# Patient Record
Sex: FEMALE | Race: BLACK OR AFRICAN AMERICAN | Employment: PART TIME | URBAN - METROPOLITAN AREA
[De-identification: names, ages, dates, MRNs, and addresses within clinical notes are randomized per-mention and may not be internally consistent; named-entity substitution may affect disease eponyms.]

---

## 2017-05-12 PROBLEM — L73.2 HIDRADENITIS SUPPURATIVA: Status: ACTIVE | Noted: 2017-05-12

## 2018-04-30 ENCOUNTER — OFFICE VISIT (OUTPATIENT)
Dept: INTERNAL MEDICINE CLINIC | Age: 28
End: 2018-04-30
Payer: COMMERCIAL

## 2018-04-30 VITALS
HEIGHT: 64 IN | BODY MASS INDEX: 41.11 KG/M2 | DIASTOLIC BLOOD PRESSURE: 77 MMHG | OXYGEN SATURATION: 100 % | WEIGHT: 240.8 LBS | SYSTOLIC BLOOD PRESSURE: 120 MMHG | HEART RATE: 76 BPM

## 2018-04-30 DIAGNOSIS — D75.839 THROMBOCYTOSIS: ICD-10-CM

## 2018-04-30 DIAGNOSIS — M25.561 ACUTE PAIN OF RIGHT KNEE: Primary | ICD-10-CM

## 2018-04-30 DIAGNOSIS — Z13.29 THYROID DISORDER SCREENING: ICD-10-CM

## 2018-04-30 DIAGNOSIS — Z12.4 CERVICAL CANCER SCREENING: ICD-10-CM

## 2018-04-30 DIAGNOSIS — E78.00 PURE HYPERCHOLESTEROLEMIA: ICD-10-CM

## 2018-04-30 DIAGNOSIS — E55.9 VITAMIN D DEFICIENCY: ICD-10-CM

## 2018-04-30 PROCEDURE — 99212 OFFICE O/P EST SF 10 MIN: CPT | Performed by: FAMILY MEDICINE

## 2018-04-30 PROCEDURE — 1036F TOBACCO NON-USER: CPT | Performed by: FAMILY MEDICINE

## 2018-04-30 PROCEDURE — G8417 CALC BMI ABV UP PARAM F/U: HCPCS | Performed by: FAMILY MEDICINE

## 2018-04-30 PROCEDURE — G8427 DOCREV CUR MEDS BY ELIG CLIN: HCPCS | Performed by: FAMILY MEDICINE

## 2018-04-30 PROCEDURE — 99213 OFFICE O/P EST LOW 20 MIN: CPT | Performed by: FAMILY MEDICINE

## 2018-04-30 ASSESSMENT — ENCOUNTER SYMPTOMS
CHOKING: 0
VOMITING: 0
ABDOMINAL PAIN: 0
NAUSEA: 0
DIARRHEA: 0
COUGH: 0
TROUBLE SWALLOWING: 0
EYE PAIN: 0
CONSTIPATION: 0
PHOTOPHOBIA: 0
ABDOMINAL DISTENTION: 0
BACK PAIN: 0
EYE DISCHARGE: 0
SORE THROAT: 0
SHORTNESS OF BREATH: 0
CHEST TIGHTNESS: 0
BLOOD IN STOOL: 0
RHINORRHEA: 0
WHEEZING: 0

## 2018-04-30 ASSESSMENT — PATIENT HEALTH QUESTIONNAIRE - PHQ9
SUM OF ALL RESPONSES TO PHQ9 QUESTIONS 1 & 2: 0
2. FEELING DOWN, DEPRESSED OR HOPELESS: 0
SUM OF ALL RESPONSES TO PHQ QUESTIONS 1-9: 0
1. LITTLE INTEREST OR PLEASURE IN DOING THINGS: 0

## 2018-05-07 PROBLEM — D75.839 THROMBOCYTOSIS: Status: ACTIVE | Noted: 2018-05-07

## 2018-10-01 ENCOUNTER — OFFICE VISIT (OUTPATIENT)
Dept: INTERNAL MEDICINE CLINIC | Age: 28
End: 2018-10-01
Payer: COMMERCIAL

## 2018-10-01 ENCOUNTER — HOSPITAL ENCOUNTER (OUTPATIENT)
Age: 28
Discharge: HOME | End: 2018-10-01
Payer: COMMERCIAL

## 2018-10-01 VITALS
HEIGHT: 63 IN | OXYGEN SATURATION: 95 % | SYSTOLIC BLOOD PRESSURE: 136 MMHG | WEIGHT: 239 LBS | BODY MASS INDEX: 42.35 KG/M2 | TEMPERATURE: 98 F | HEART RATE: 74 BPM | DIASTOLIC BLOOD PRESSURE: 98 MMHG

## 2018-10-01 DIAGNOSIS — Z13.29 THYROID DISORDER SCREENING: ICD-10-CM

## 2018-10-01 DIAGNOSIS — D75.839 THROMBOCYTOSIS: ICD-10-CM

## 2018-10-01 DIAGNOSIS — Z23 INFLUENZA VACCINE ADMINISTERED: ICD-10-CM

## 2018-10-01 DIAGNOSIS — E55.9 VITAMIN D DEFICIENCY: ICD-10-CM

## 2018-10-01 DIAGNOSIS — Z11.4 ENCOUNTER FOR SCREENING FOR HIV: Primary | ICD-10-CM

## 2018-10-01 DIAGNOSIS — E78.00 PURE HYPERCHOLESTEROLEMIA: ICD-10-CM

## 2018-10-01 DIAGNOSIS — Z11.4 ENCOUNTER FOR SCREENING FOR HIV: ICD-10-CM

## 2018-10-01 DIAGNOSIS — F41.8 ANXIETY WITH DEPRESSION: ICD-10-CM

## 2018-10-01 LAB
ALBUMIN SERPL-MCNC: 3.9 G/DL (ref 3.5–5.2)
ALP BLD-CCNC: 79 U/L (ref 35–104)
ALT SERPL-CCNC: 19 U/L (ref 0–32)
ANION GAP SERPL CALCULATED.3IONS-SCNC: 13 MMOL/L (ref 7–16)
AST SERPL-CCNC: 15 U/L (ref 0–31)
BASOPHILS ABSOLUTE: 0.04 E9/L (ref 0–0.2)
BASOPHILS RELATIVE PERCENT: 0.4 % (ref 0–2)
BILIRUB SERPL-MCNC: 0.3 MG/DL (ref 0–1.2)
BUN BLDV-MCNC: 11 MG/DL (ref 6–20)
CALCIUM SERPL-MCNC: 8.7 MG/DL (ref 8.6–10.2)
CHLORIDE BLD-SCNC: 103 MMOL/L (ref 98–107)
CHOLESTEROL, TOTAL: 174 MG/DL (ref 0–199)
CO2: 23 MMOL/L (ref 22–29)
CREAT SERPL-MCNC: 0.6 MG/DL (ref 0.5–1)
EOSINOPHILS ABSOLUTE: 0.93 E9/L (ref 0.05–0.5)
EOSINOPHILS RELATIVE PERCENT: 9.1 % (ref 0–6)
GFR AFRICAN AMERICAN: >60
GFR NON-AFRICAN AMERICAN: >60 ML/MIN/1.73
GLUCOSE BLD-MCNC: 104 MG/DL (ref 74–109)
HCT VFR BLD CALC: 37.3 % (ref 34–48)
HDLC SERPL-MCNC: 40 MG/DL
HEMOGLOBIN: 12.4 G/DL (ref 11.5–15.5)
IMMATURE GRANULOCYTES #: 0.03 E9/L
IMMATURE GRANULOCYTES %: 0.3 % (ref 0–5)
LDL CHOLESTEROL CALCULATED: 119 MG/DL (ref 0–99)
LYMPHOCYTES ABSOLUTE: 2.28 E9/L (ref 1.5–4)
LYMPHOCYTES RELATIVE PERCENT: 22.4 % (ref 20–42)
MCH RBC QN AUTO: 29.4 PG (ref 26–35)
MCHC RBC AUTO-ENTMCNC: 33.2 % (ref 32–34.5)
MCV RBC AUTO: 88.4 FL (ref 80–99.9)
MONOCYTES ABSOLUTE: 0.46 E9/L (ref 0.1–0.95)
MONOCYTES RELATIVE PERCENT: 4.5 % (ref 2–12)
NEUTROPHILS ABSOLUTE: 6.44 E9/L (ref 1.8–7.3)
NEUTROPHILS RELATIVE PERCENT: 63.3 % (ref 43–80)
PDW BLD-RTO: 14.6 FL (ref 11.5–15)
PLATELET # BLD: 467 E9/L (ref 130–450)
PMV BLD AUTO: 8.8 FL (ref 7–12)
POTASSIUM SERPL-SCNC: 3.7 MMOL/L (ref 3.5–5)
RBC # BLD: 4.22 E12/L (ref 3.5–5.5)
SODIUM BLD-SCNC: 139 MMOL/L (ref 132–146)
TOTAL PROTEIN: 7.3 G/DL (ref 6.4–8.3)
TRIGL SERPL-MCNC: 75 MG/DL (ref 0–149)
TSH SERPL DL<=0.05 MIU/L-ACNC: 2.1 UIU/ML (ref 0.27–4.2)
VITAMIN D 25-HYDROXY: 9 NG/ML (ref 30–100)
VLDLC SERPL CALC-MCNC: 15 MG/DL
WBC # BLD: 10.2 E9/L (ref 4.5–11.5)

## 2018-10-01 PROCEDURE — 99213 OFFICE O/P EST LOW 20 MIN: CPT | Performed by: FAMILY MEDICINE

## 2018-10-01 PROCEDURE — 80061 LIPID PANEL: CPT

## 2018-10-01 PROCEDURE — 86703 HIV-1/HIV-2 1 RESULT ANTBDY: CPT

## 2018-10-01 PROCEDURE — G8417 CALC BMI ABV UP PARAM F/U: HCPCS | Performed by: FAMILY MEDICINE

## 2018-10-01 PROCEDURE — 1036F TOBACCO NON-USER: CPT | Performed by: FAMILY MEDICINE

## 2018-10-01 PROCEDURE — G8427 DOCREV CUR MEDS BY ELIG CLIN: HCPCS | Performed by: FAMILY MEDICINE

## 2018-10-01 PROCEDURE — 80053 COMPREHEN METABOLIC PANEL: CPT

## 2018-10-01 PROCEDURE — 84443 ASSAY THYROID STIM HORMONE: CPT

## 2018-10-01 PROCEDURE — 82306 VITAMIN D 25 HYDROXY: CPT

## 2018-10-01 PROCEDURE — 36415 COLL VENOUS BLD VENIPUNCTURE: CPT

## 2018-10-01 PROCEDURE — G8482 FLU IMMUNIZE ORDER/ADMIN: HCPCS | Performed by: FAMILY MEDICINE

## 2018-10-01 PROCEDURE — 85025 COMPLETE CBC W/AUTO DIFF WBC: CPT

## 2018-10-01 PROCEDURE — 90688 IIV4 VACCINE SPLT 0.5 ML IM: CPT | Performed by: FAMILY MEDICINE

## 2018-10-01 RX ORDER — VENLAFAXINE HYDROCHLORIDE 37.5 MG/1
37.5 CAPSULE, EXTENDED RELEASE ORAL DAILY
Qty: 49 CAPSULE | Refills: 2 | Status: CANCELLED | OUTPATIENT
Start: 2018-10-01

## 2018-10-01 RX ORDER — VENLAFAXINE HYDROCHLORIDE 37.5 MG/1
37.5 TABLET, EXTENDED RELEASE ORAL
Qty: 7 TABLET | Refills: 0 | Status: SHIPPED | OUTPATIENT
Start: 2018-10-01 | End: 2018-11-05 | Stop reason: DRUGHIGH

## 2018-10-01 RX ORDER — VENLAFAXINE HYDROCHLORIDE 75 MG/1
75 TABLET, EXTENDED RELEASE ORAL
Qty: 30 TABLET | Refills: 1 | Status: SHIPPED | OUTPATIENT
Start: 2018-10-01 | End: 2018-11-05 | Stop reason: SDUPTHER

## 2018-10-01 RX ORDER — ERGOCALCIFEROL 1.25 MG/1
50000 CAPSULE ORAL WEEKLY
Qty: 12 CAPSULE | Refills: 0 | Status: SHIPPED | OUTPATIENT
Start: 2018-10-01 | End: 2018-10-25 | Stop reason: SDUPTHER

## 2018-10-01 ASSESSMENT — ENCOUNTER SYMPTOMS
ABDOMINAL DISTENTION: 0
WHEEZING: 0
EYE PAIN: 0
PHOTOPHOBIA: 0
VOMITING: 0
RHINORRHEA: 0
NAUSEA: 0
EYE DISCHARGE: 0
COUGH: 0
CHOKING: 0
CHEST TIGHTNESS: 0
ABDOMINAL PAIN: 0
BACK PAIN: 0
CONSTIPATION: 0
TROUBLE SWALLOWING: 0
BLOOD IN STOOL: 0
DIARRHEA: 0
SHORTNESS OF BREATH: 0
SORE THROAT: 0

## 2018-10-01 NOTE — PROGRESS NOTES
Height: 5' 3\" (1.6 m)      Physical Exam   Constitutional: She is oriented to person, place, and time. She appears well-developed and well-nourished. No distress. Large body habitus   HENT:   Head: Normocephalic and atraumatic. Right Ear: External ear normal.   Left Ear: External ear normal.   Nose: Nose normal.   Mouth/Throat: Oropharynx is clear and moist.   Eyes: Pupils are equal, round, and reactive to light. Conjunctivae and EOM are normal.   Neck: Normal range of motion. Neck supple. No thyromegaly present. Cardiovascular: Normal rate, regular rhythm, normal heart sounds and intact distal pulses. Exam reveals no gallop and no friction rub. No murmur heard. Pulmonary/Chest: Effort normal and breath sounds normal. No respiratory distress. She has no wheezes. Abdominal: Soft. Bowel sounds are normal. She exhibits no distension and no mass. There is no hepatosplenomegaly. There is no tenderness. Abdominal obesity   Musculoskeletal: Normal range of motion. She exhibits no edema or tenderness. Neurological: She is alert and oriented to person, place, and time. She has normal reflexes. Skin: Skin is warm and dry. No rash noted. Psychiatric: Judgment and thought content normal. Her mood appears anxious. Vitals reviewed. Osteopathic exam:  Patient evaluated in the seated position. Normal thoracic kyphosis and lumbar lordosis. No acute tissue texture changes. No acute somatic dysfunction of the cervical, thoracic, or lumbar spine.     Lab Results    Lab Results   Component Value Date    WBC 12.3 (H) 01/15/2018    HGB 12.8 01/15/2018    HCT 38.3 01/15/2018     (H) 01/15/2018    CHOL 169 08/25/2017    TRIG 122 08/25/2017    HDL 35 01/15/2018    ALT 13 01/15/2018    AST 15 01/15/2018     01/15/2018    K 3.8 01/15/2018     01/15/2018    CREATININE 0.6 01/15/2018    BUN 10 01/15/2018    CO2 23 01/15/2018    TSH 1.910 08/25/2017    GLUF 83 01/15/2018     Lab Results

## 2018-10-02 LAB
HIV-1 AND HIV-2 ANTIBODIES: NORMAL
PATHOLOGIST REVIEW: NORMAL

## 2018-10-25 DIAGNOSIS — E55.9 VITAMIN D DEFICIENCY: ICD-10-CM

## 2018-10-25 RX ORDER — ERGOCALCIFEROL 1.25 MG/1
50000 CAPSULE ORAL WEEKLY
Qty: 4 CAPSULE | Refills: 1 | Status: SHIPPED | OUTPATIENT
Start: 2018-10-25 | End: 2018-11-05 | Stop reason: SDUPTHER

## 2018-11-05 ENCOUNTER — OFFICE VISIT (OUTPATIENT)
Dept: INTERNAL MEDICINE CLINIC | Age: 28
End: 2018-11-05
Payer: COMMERCIAL

## 2018-11-05 ENCOUNTER — HOSPITAL ENCOUNTER (OUTPATIENT)
Age: 28
Discharge: HOME | End: 2018-11-07
Payer: COMMERCIAL

## 2018-11-05 VITALS
DIASTOLIC BLOOD PRESSURE: 95 MMHG | HEART RATE: 92 BPM | SYSTOLIC BLOOD PRESSURE: 135 MMHG | BODY MASS INDEX: 41.46 KG/M2 | HEIGHT: 63 IN | OXYGEN SATURATION: 95 % | TEMPERATURE: 98.1 F | WEIGHT: 234 LBS

## 2018-11-05 DIAGNOSIS — D75.839 THROMBOCYTOSIS: ICD-10-CM

## 2018-11-05 DIAGNOSIS — L73.2 HIDRADENITIS SUPPURATIVA: ICD-10-CM

## 2018-11-05 DIAGNOSIS — Z01.419 WELL WOMAN EXAM WITH ROUTINE GYNECOLOGICAL EXAM: Primary | ICD-10-CM

## 2018-11-05 DIAGNOSIS — E78.00 PURE HYPERCHOLESTEROLEMIA: ICD-10-CM

## 2018-11-05 DIAGNOSIS — E55.9 VITAMIN D DEFICIENCY: ICD-10-CM

## 2018-11-05 DIAGNOSIS — F41.8 ANXIETY WITH DEPRESSION: ICD-10-CM

## 2018-11-05 DIAGNOSIS — Z12.4 PAP SMEAR FOR CERVICAL CANCER SCREENING: ICD-10-CM

## 2018-11-05 PROCEDURE — 87591 N.GONORRHOEAE DNA AMP PROB: CPT

## 2018-11-05 PROCEDURE — 99213 OFFICE O/P EST LOW 20 MIN: CPT | Performed by: FAMILY MEDICINE

## 2018-11-05 PROCEDURE — 87624 HPV HI-RISK TYP POOLED RSLT: CPT

## 2018-11-05 PROCEDURE — 1036F TOBACCO NON-USER: CPT | Performed by: FAMILY MEDICINE

## 2018-11-05 PROCEDURE — G8482 FLU IMMUNIZE ORDER/ADMIN: HCPCS | Performed by: FAMILY MEDICINE

## 2018-11-05 PROCEDURE — 87491 CHLMYD TRACH DNA AMP PROBE: CPT

## 2018-11-05 PROCEDURE — G8427 DOCREV CUR MEDS BY ELIG CLIN: HCPCS | Performed by: FAMILY MEDICINE

## 2018-11-05 PROCEDURE — 88175 CYTOPATH C/V AUTO FLUID REDO: CPT

## 2018-11-05 PROCEDURE — G8417 CALC BMI ABV UP PARAM F/U: HCPCS | Performed by: FAMILY MEDICINE

## 2018-11-05 RX ORDER — VENLAFAXINE HYDROCHLORIDE 75 MG/1
75 TABLET, EXTENDED RELEASE ORAL
Qty: 30 TABLET | Refills: 3 | Status: SHIPPED | OUTPATIENT
Start: 2018-11-05 | End: 2019-02-25

## 2018-11-05 RX ORDER — ERGOCALCIFEROL 1.25 MG/1
50000 CAPSULE ORAL WEEKLY
Qty: 4 CAPSULE | Refills: 2 | Status: SHIPPED | OUTPATIENT
Start: 2018-11-05 | End: 2019-02-25 | Stop reason: SDUPTHER

## 2018-11-05 ASSESSMENT — ENCOUNTER SYMPTOMS
DIARRHEA: 0
CHEST TIGHTNESS: 0
TROUBLE SWALLOWING: 0
SHORTNESS OF BREATH: 0
CHOKING: 0
NAUSEA: 0
BLOOD IN STOOL: 0
ABDOMINAL PAIN: 0
EYE DISCHARGE: 0
CONSTIPATION: 0
BACK PAIN: 0
WHEEZING: 0
PHOTOPHOBIA: 0
SORE THROAT: 0
RHINORRHEA: 0
COUGH: 0
ABDOMINAL DISTENTION: 0
EYE PAIN: 0
VOMITING: 0

## 2018-11-05 NOTE — PROGRESS NOTES
thoracic kyphosis and lumbar lordosis. No acute tissue texture changes. No acute somatic dysfunction of the cervical, thoracic, or lumbar spine. Lab Results    Lab Results   Component Value Date    WBC 10.2 10/01/2018    HGB 12.4 10/01/2018    HCT 37.3 10/01/2018     (H) 10/01/2018    CHOL 174 10/01/2018    TRIG 75 10/01/2018    HDL 40 10/01/2018    ALT 19 10/01/2018    AST 15 10/01/2018     10/01/2018    K 3.7 10/01/2018     10/01/2018    CREATININE 0.6 10/01/2018    BUN 11 10/01/2018    CO2 23 10/01/2018    TSH 2.100 10/01/2018    GLUF 83 01/15/2018     No results found for: PSA, CEA, , [id number],   Assessment and Plan:  [facility] was seen today for 1 month follow-up and health maintenance. Diagnoses and all orders for this visit:    Well woman exam with routine gynecological exam  -     PAP SMEAR    Anxiety with depression  -     venlafaxine 75 MG extended release tablet; Take 1 tablet by mouth daily (with breakfast)    Vitamin D deficiency  -     vitamin D (ERGOCALCIFEROL) 52062 units CAPS capsule; Take 1 capsule by mouth once a week    Pap smear for cervical cancer screening  -     PAP SMEAR    Thrombocytosis ([address]        -    Continue to monitor    Pure hypercholesterolemia        -    Low fat diet and increase physical activity    Hidradenitis suppurativa         -    No active lesions       Return in about 2 weeks (around 11/19/2018) for Pap results. .    Patient may come in sooner if needed for medical concerns. Patient advised tocall at any time to cancel or re-schedule or for any questions/concerns. Patient was seen and discussed with attending physician, Dr. Valentin Clemens.     Sabiha Arnold DO PGY3 [facility]  11/05/18  1:50 PM

## 2018-11-05 NOTE — PATIENT INSTRUCTIONS
cancer. Two tests can be used to screen for cervical cancer. They may be used alone or together. A Pap test.   This test looks for changes in the cells of the cervix. Abnormal cells may be a sign of cancer. A human papillomavirus (HPV) test.   This test looks for the HPV virus. Some types of HPV can cause cancer. During either test, the doctor or nurse will insert a tool called a speculum into your vagina. The speculum gently spreads apart the vaginal walls. It allows your doctor to see inside the vagina and the cervix. He or she uses a small swab or brush to collect cell samples from your cervix. Try to schedule the test when you're not having your period. To get ready for the test, avoid douches, tampons, vaginal medicines, sprays, and powders for at least a day before you have the test.  When should you have a screening test?  These guidelines apply to women who are at average risk of cervical cancer. If you don't know your risk, talk with your doctor. Women 21 to 34  · You can start having a Pap test at age 24. It is done every 3 years. · You can start having the primary HPV test at age 22. It is done every 3 years. Women 30 to 59  · If you had both a Pap test and an HPV test last time and both were normal, you can have a Pap plus an HPV test every 5 years. · If you had only a Pap test last time, as long as your results are normal, you can have a Pap test every 3 years. · If you had only an HPV test last time, as long as your results are normal, you can have an HPV test every 3 years. Women 72 and older  · If you are age 72 or older, talk with your doctor about what's right for you. Women ages 72 and older may no longer need to be screened for cervical cancer. When to stop having screening tests depends on your medical history, your overall health, and your risk of cervical cell changes or cervical cancer.   What happens after the test?  The sample of cells taken during your test will be sent to a lab

## 2018-11-12 LAB
CHLAMYDIA TRACHOMATIS AMPLIFIED DET: NORMAL
N GONORRHOEAE AMPLIFIED DET: NORMAL

## 2018-11-14 LAB
CORRESPONDING PAP CASE #: NORMAL
HPV, HIGH RISK: NEGATIVE

## 2018-11-26 ENCOUNTER — OFFICE VISIT (OUTPATIENT)
Dept: INTERNAL MEDICINE CLINIC | Age: 28
End: 2018-11-26
Payer: COMMERCIAL

## 2018-11-26 VITALS
SYSTOLIC BLOOD PRESSURE: 139 MMHG | OXYGEN SATURATION: 94 % | DIASTOLIC BLOOD PRESSURE: 82 MMHG | BODY MASS INDEX: 41.46 KG/M2 | TEMPERATURE: 98.1 F | WEIGHT: 234 LBS | HEART RATE: 90 BPM | HEIGHT: 63 IN

## 2018-11-26 DIAGNOSIS — B96.89 BACTERIAL VAGINOSIS: ICD-10-CM

## 2018-11-26 DIAGNOSIS — N76.0 BACTERIAL VAGINOSIS: ICD-10-CM

## 2018-11-26 DIAGNOSIS — Z71.2 ENCOUNTER TO DISCUSS TEST RESULTS: Primary | ICD-10-CM

## 2018-11-26 DIAGNOSIS — R40.20 LOC (LOSS OF CONSCIOUSNESS) (HCC): ICD-10-CM

## 2018-11-26 PROCEDURE — 99212 OFFICE O/P EST SF 10 MIN: CPT | Performed by: FAMILY MEDICINE

## 2018-11-26 PROCEDURE — G8482 FLU IMMUNIZE ORDER/ADMIN: HCPCS | Performed by: FAMILY MEDICINE

## 2018-11-26 PROCEDURE — G8417 CALC BMI ABV UP PARAM F/U: HCPCS | Performed by: FAMILY MEDICINE

## 2018-11-26 PROCEDURE — G8427 DOCREV CUR MEDS BY ELIG CLIN: HCPCS | Performed by: FAMILY MEDICINE

## 2018-11-26 PROCEDURE — 99213 OFFICE O/P EST LOW 20 MIN: CPT | Performed by: FAMILY MEDICINE

## 2018-11-26 PROCEDURE — 1036F TOBACCO NON-USER: CPT | Performed by: FAMILY MEDICINE

## 2018-11-26 RX ORDER — METRONIDAZOLE 500 MG/1
500 TABLET ORAL 2 TIMES DAILY
Qty: 14 TABLET | Refills: 0 | Status: SHIPPED | OUTPATIENT
Start: 2018-11-26 | End: 2019-03-01 | Stop reason: SDUPTHER

## 2018-11-26 ASSESSMENT — ENCOUNTER SYMPTOMS
BACK PAIN: 0
CONSTIPATION: 0
EYE DISCHARGE: 0
WHEEZING: 0
DIARRHEA: 0
EYE PAIN: 0
VOMITING: 0
CHOKING: 0
PHOTOPHOBIA: 0
TROUBLE SWALLOWING: 0
ABDOMINAL PAIN: 0
ABDOMINAL DISTENTION: 0
RHINORRHEA: 0
SORE THROAT: 0
BLOOD IN STOOL: 0
COUGH: 0
NAUSEA: 0
SHORTNESS OF BREATH: 0
CHEST TIGHTNESS: 0

## 2018-11-26 NOTE — PROGRESS NOTES
Subjective:  33 y.o. female who presents in office today for results pap smear collected on 11/05/2018. Patient is complaining of malodorous vaginal discharge. Patient also complaining of \"passing out\" on the evening of 11/24/2018. Patient resumed Effexor XR on 10/01/2018 and took the medication at 6 pm. Patient states that she consumed a few glasses of wine about 2- 3 hrs later. Patient states that she passed out 3 times afterwards. Advised patient not to consume alcohol and Effexor together. NEGATIVE FOR INTRAEPITHELIAL LESION OR MALIGNANCY    INTERPRETATION/RESULT:  Predominance of coccobacilli consistent with a shift in vaginal jason      Review of Systems   Constitutional: Negative for activity change, appetite change, chills, fever and unexpected weight change. HENT: Negative for congestion, ear pain, hearing loss, rhinorrhea, sore throat and trouble swallowing. Eyes: Negative for photophobia, pain, discharge and visual disturbance. Respiratory: Negative for cough, choking, chest tightness, shortness of breath and wheezing. Cardiovascular: Negative for chest pain, palpitations and leg swelling. Gastrointestinal: Negative for abdominal distention, abdominal pain, blood in stool, constipation, diarrhea, nausea and vomiting. Endocrine: Negative for cold intolerance, heat intolerance, polydipsia and polyuria. Genitourinary: Positive for vaginal discharge. Negative for decreased urine volume, dysuria, flank pain, frequency, hematuria and urgency. Musculoskeletal: Negative for arthralgias, back pain, joint swelling, neck pain and neck stiffness. Skin: Negative for pallor, rash and wound. Neurological: Negative for dizziness, tremors, weakness, light-headedness, numbness and headaches. Admits to brief episodes of \"blackout\"   Hematological: Does not bruise/bleed easily. Psychiatric/Behavioral: Negative for confusion and suicidal ideas. The patient is not nervous/anxious. Objective:  Vitals:    11/26/18 0919   BP: 139/82   Pulse: 90   Temp: 98.1 °F (36.7 °C)   SpO2: 94%       Physical Exam   Constitutional: She is oriented to person, place, and time. She appears well-developed and well-nourished. No distress. Large body habitus   HENT:   Head: Normocephalic and atraumatic. Right Ear: External ear normal.   Left Ear: External ear normal.   Nose: Nose normal.   Mouth/Throat: Oropharynx is clear and moist.   Eyes: Pupils are equal, round, and reactive to light. Conjunctivae and EOM are normal.   Neck: Normal range of motion. Neck supple. No thyromegaly present. Cardiovascular: Normal rate, regular rhythm, normal heart sounds and intact distal pulses. Exam reveals no gallop and no friction rub. No murmur heard. Pulmonary/Chest: Effort normal and breath sounds normal. No respiratory distress. She has no wheezes. Abdominal: Soft. Bowel sounds are normal. She exhibits no distension and no mass. There is no hepatosplenomegaly. There is no tenderness. Genitourinary:   Genitourinary Comments: deferred   Musculoskeletal: Normal range of motion. She exhibits no edema or tenderness. Neurological: She is alert and oriented to person, place, and time. She has normal reflexes. Skin: Skin is warm and dry. No rash noted. Psychiatric: She has a normal mood and affect. Judgment and thought content normal.   Vitals reviewed. Osteopathic exam:  Patient evaluated inthe seated position. Normal thoracic kyphosis and lumbar lordosis. No acute tissue texture changes. No acute somatic dysfunction of the cervical, thoracic, or lumbar spine. Assessment and Plan:  Yuliya Allen was seen today for follow-up, results and health maintenance. Diagnoses and all orders for this visit:    Encounter to discuss test results         Bacterial vaginosis  -     metroNIDAZOLE (FLAGYL) 500 MG tablet;  Take 1 tablet by mouth 2 times daily for 7 days    LOC (loss of consciousness) ([address]  -

## 2018-11-27 ENCOUNTER — TELEPHONE (OUTPATIENT)
Dept: INTERNAL MEDICINE CLINIC | Age: 28
End: 2018-11-27

## 2018-12-04 ENCOUNTER — HOSPITAL ENCOUNTER (OUTPATIENT)
Dept: CT IMAGING | Age: 28
Discharge: HOME | End: 2018-12-04
Payer: COMMERCIAL

## 2018-12-04 DIAGNOSIS — R40.20 LOC (LOSS OF CONSCIOUSNESS) (HCC): ICD-10-CM

## 2018-12-04 PROCEDURE — 6360000004 HC RX CONTRAST MEDICATION: Performed by: RADIOLOGY

## 2018-12-04 PROCEDURE — 70470 CT HEAD/BRAIN W/O & W/DYE: CPT

## 2018-12-04 RX ADMIN — IOPAMIDOL 50 ML: 755 INJECTION, SOLUTION INTRAVENOUS at 11:24

## 2018-12-10 ENCOUNTER — OFFICE VISIT (OUTPATIENT)
Dept: INTERNAL MEDICINE CLINIC | Age: 28
End: 2018-12-10
Payer: COMMERCIAL

## 2018-12-10 VITALS
BODY MASS INDEX: 40.68 KG/M2 | DIASTOLIC BLOOD PRESSURE: 62 MMHG | HEIGHT: 63 IN | TEMPERATURE: 98.2 F | HEART RATE: 82 BPM | WEIGHT: 229.6 LBS | OXYGEN SATURATION: 97 % | SYSTOLIC BLOOD PRESSURE: 114 MMHG

## 2018-12-10 DIAGNOSIS — Z71.2 ENCOUNTER TO DISCUSS TEST RESULTS: Primary | ICD-10-CM

## 2018-12-10 PROCEDURE — 99212 OFFICE O/P EST SF 10 MIN: CPT | Performed by: FAMILY MEDICINE

## 2018-12-10 PROCEDURE — 99213 OFFICE O/P EST LOW 20 MIN: CPT | Performed by: FAMILY MEDICINE

## 2018-12-10 PROCEDURE — G8427 DOCREV CUR MEDS BY ELIG CLIN: HCPCS | Performed by: FAMILY MEDICINE

## 2018-12-10 PROCEDURE — 1036F TOBACCO NON-USER: CPT | Performed by: FAMILY MEDICINE

## 2018-12-10 PROCEDURE — G8482 FLU IMMUNIZE ORDER/ADMIN: HCPCS | Performed by: FAMILY MEDICINE

## 2018-12-10 PROCEDURE — G8417 CALC BMI ABV UP PARAM F/U: HCPCS | Performed by: FAMILY MEDICINE

## 2018-12-10 ASSESSMENT — ENCOUNTER SYMPTOMS
PHOTOPHOBIA: 0
ABDOMINAL DISTENTION: 0
BLOOD IN STOOL: 0
SORE THROAT: 0
CHEST TIGHTNESS: 0
TROUBLE SWALLOWING: 0
DIARRHEA: 0
VOMITING: 0
RHINORRHEA: 0
EYE DISCHARGE: 0
EYE PAIN: 0
CHOKING: 0
NAUSEA: 0
ABDOMINAL PAIN: 0
CONSTIPATION: 0
SHORTNESS OF BREATH: 0
BACK PAIN: 0
COUGH: 0
WHEEZING: 0

## 2018-12-10 NOTE — PROGRESS NOTES
S: Hilaria Serna 33 y.o. female  here for follow up of CT head results for seizure disorder  CT scan of head: negative for structural abnormalities   O: VS: /62   Pulse 82   Temp 98.2 °F (36.8 °C) (Oral)   Ht 5' 3\" (1.6 m)   Wt 229 lb 9.6 oz (104.1 kg)   LMP 11/30/2018   SpO2 97%   Breastfeeding? No   BMI 40.67 kg/m²    General: NAD   CV:  RRR, no gallops, rubs, or murmurs   Resp: CTAB no R/R/W   Abd:  Soft, nontender, no masses    Ext:  no C/C/E    Assessment / Plan:      Evita Arroyo was seen today for follow-up, health maintenance and results. Diagnoses and all orders for this visit:    Encounter to discuss test results:  Test result normal        -     Abstaining from EtOH        -     Resumed Venlafaxine in AM     Return in about 2 months (around 2/18/2019) for Anxiety. Attending Physician Statement     I have discussed the case, including pertinent history and exam findings with the resident. I agree with the documented assessment and plan.          Jet Pantoja MD
well-developed and well-nourished. No distress. HENT:   Head: Normocephalic and atraumatic. Right Ear: External ear normal.   Left Ear: External ear normal.   Nose: Nose normal.   Mouth/Throat: Oropharynx is clear and moist.   Eyes: Pupils are equal, round, and reactive to light. Conjunctivae and EOM are normal.   Neck: Normal range of motion. Neck supple. No thyromegaly present. Cardiovascular: Normal rate, regular rhythm, normal heart sounds and intact distal pulses. Exam reveals no gallop and no friction rub. No murmur heard. Pulmonary/Chest: Effort normal and breath sounds normal. No respiratory distress. She has no wheezes. Abdominal: Soft. Bowel sounds are normal. She exhibits no distension and no mass. There is no hepatosplenomegaly. There is no tenderness. Abdominal obesity   Musculoskeletal: Normal range of motion. She exhibits no edema or tenderness. Neurological: She is alert and oriented to person, place, and time. She has normal reflexes. Skin: Skin is warm and dry. No rash noted. Psychiatric: She has a normal mood and affect. Judgment and thought content normal.   Vitals reviewed. Osteopathic exam:  Patient evaluated inthe seated position. Normal thoracic kyphosis and lumbar lordosis. No acute tissue texture changes. No acute somatic dysfunction of the cervical, thoracic, or lumbar spine.     Lab Results    Lab Results   Component Value Date    WBC 10.2 10/01/2018    HGB 12.4 10/01/2018    HCT 37.3 10/01/2018     (H) 10/01/2018    CHOL 174 10/01/2018    TRIG 75 10/01/2018    HDL 40 10/01/2018    ALT 19 10/01/2018    AST 15 10/01/2018     10/01/2018    K 3.7 10/01/2018     10/01/2018    CREATININE 0.6 10/01/2018    BUN 11 10/01/2018    CO2 23 10/01/2018    TSH 2.100 10/01/2018    GLUF 83 01/15/2018     Lab Results   Component Value Date    COLORU Yellow 03/09/2016    NITRU Negative 03/09/2016    GLUCOSEU Negative 03/09/2016    KETUA Negative 03/09/2016

## 2019-02-25 ENCOUNTER — TELEPHONE (OUTPATIENT)
Dept: INTERNAL MEDICINE CLINIC | Age: 29
End: 2019-02-25

## 2019-02-25 ENCOUNTER — OFFICE VISIT (OUTPATIENT)
Dept: INTERNAL MEDICINE CLINIC | Age: 29
End: 2019-02-25
Payer: COMMERCIAL

## 2019-02-25 ENCOUNTER — HOSPITAL ENCOUNTER (OUTPATIENT)
Age: 29
Discharge: HOME | End: 2019-02-25
Payer: COMMERCIAL

## 2019-02-25 VITALS
HEART RATE: 78 BPM | BODY MASS INDEX: 42.28 KG/M2 | OXYGEN SATURATION: 96 % | DIASTOLIC BLOOD PRESSURE: 60 MMHG | HEIGHT: 63 IN | TEMPERATURE: 97.9 F | SYSTOLIC BLOOD PRESSURE: 105 MMHG | WEIGHT: 238.6 LBS

## 2019-02-25 DIAGNOSIS — R35.0 URINARY FREQUENCY: ICD-10-CM

## 2019-02-25 DIAGNOSIS — F41.8 ANXIETY WITH DEPRESSION: Primary | ICD-10-CM

## 2019-02-25 DIAGNOSIS — N89.8 VAGINAL DISCHARGE: ICD-10-CM

## 2019-02-25 DIAGNOSIS — E66.01 MORBID OBESITY WITH BMI OF 40.0-44.9, ADULT (HCC): ICD-10-CM

## 2019-02-25 DIAGNOSIS — F41.8 ANXIETY WITH DEPRESSION: ICD-10-CM

## 2019-02-25 DIAGNOSIS — R10.2 PELVIC PAIN: ICD-10-CM

## 2019-02-25 DIAGNOSIS — E55.9 VITAMIN D DEFICIENCY: ICD-10-CM

## 2019-02-25 DIAGNOSIS — L68.0 HIRSUTISM: ICD-10-CM

## 2019-02-25 LAB
ALBUMIN SERPL-MCNC: 4.1 G/DL (ref 3.5–5.2)
ALP BLD-CCNC: 86 U/L (ref 35–104)
ALT SERPL-CCNC: 27 U/L (ref 0–32)
ANION GAP SERPL CALCULATED.3IONS-SCNC: 13 MMOL/L (ref 7–16)
AST SERPL-CCNC: 22 U/L (ref 0–31)
BACTERIA: ABNORMAL /HPF
BILIRUB SERPL-MCNC: 0.2 MG/DL (ref 0–1.2)
BILIRUBIN URINE: NEGATIVE
BLOOD, URINE: ABNORMAL
BUN BLDV-MCNC: 11 MG/DL (ref 6–20)
CALCIUM SERPL-MCNC: 9.1 MG/DL (ref 8.6–10.2)
CHLORIDE BLD-SCNC: 102 MMOL/L (ref 98–107)
CLARITY: ABNORMAL
CO2: 25 MMOL/L (ref 22–29)
COLOR: YELLOW
CREAT SERPL-MCNC: 0.6 MG/DL (ref 0.5–1)
FOLLICLE STIMULATING HORMONE: 6.5 MIU/ML
GFR AFRICAN AMERICAN: >60
GFR NON-AFRICAN AMERICAN: >60 ML/MIN/1.73
GLUCOSE BLD-MCNC: 71 MG/DL (ref 74–99)
GLUCOSE URINE: NEGATIVE MG/DL
HCT VFR BLD CALC: 40.8 % (ref 34–48)
HEMOGLOBIN: 13.6 G/DL (ref 11.5–15.5)
KETONES, URINE: ABNORMAL MG/DL
LEUKOCYTE ESTERASE, URINE: ABNORMAL
MCH RBC QN AUTO: 30.3 PG (ref 26–35)
MCHC RBC AUTO-ENTMCNC: 33.3 % (ref 32–34.5)
MCV RBC AUTO: 90.9 FL (ref 80–99.9)
NITRITE, URINE: NEGATIVE
PDW BLD-RTO: 14 FL (ref 11.5–15)
PH UA: 6 (ref 5–9)
PLATELET # BLD: 486 E9/L (ref 130–450)
PMV BLD AUTO: 9.3 FL (ref 7–12)
POTASSIUM SERPL-SCNC: 4 MMOL/L (ref 3.5–5)
PROTEIN UA: ABNORMAL MG/DL
RBC # BLD: 4.49 E12/L (ref 3.5–5.5)
RBC UA: ABNORMAL /HPF (ref 0–2)
SODIUM BLD-SCNC: 140 MMOL/L (ref 132–146)
SPECIFIC GRAVITY UA: 1.02 (ref 1–1.03)
TOTAL PROTEIN: 7.9 G/DL (ref 6.4–8.3)
TRICHOMONAS: ABNORMAL /HPF
TSH SERPL DL<=0.05 MIU/L-ACNC: 2.02 UIU/ML (ref 0.27–4.2)
UROBILINOGEN, URINE: 0.2 E.U./DL
VITAMIN D 25-HYDROXY: 12 NG/ML (ref 30–100)
WBC # BLD: 14 E9/L (ref 4.5–11.5)
WBC UA: ABNORMAL /HPF (ref 0–5)

## 2019-02-25 PROCEDURE — 80053 COMPREHEN METABOLIC PANEL: CPT

## 2019-02-25 PROCEDURE — 82627 DEHYDROEPIANDROSTERONE: CPT

## 2019-02-25 PROCEDURE — 85027 COMPLETE CBC AUTOMATED: CPT

## 2019-02-25 PROCEDURE — 83001 ASSAY OF GONADOTROPIN (FSH): CPT

## 2019-02-25 PROCEDURE — 81001 URINALYSIS AUTO W/SCOPE: CPT

## 2019-02-25 PROCEDURE — G8427 DOCREV CUR MEDS BY ELIG CLIN: HCPCS | Performed by: FAMILY MEDICINE

## 2019-02-25 PROCEDURE — 84270 ASSAY OF SEX HORMONE GLOBUL: CPT

## 2019-02-25 PROCEDURE — G8482 FLU IMMUNIZE ORDER/ADMIN: HCPCS | Performed by: FAMILY MEDICINE

## 2019-02-25 PROCEDURE — 82306 VITAMIN D 25 HYDROXY: CPT

## 2019-02-25 PROCEDURE — 87088 URINE BACTERIA CULTURE: CPT

## 2019-02-25 PROCEDURE — 99213 OFFICE O/P EST LOW 20 MIN: CPT | Performed by: FAMILY MEDICINE

## 2019-02-25 PROCEDURE — G8417 CALC BMI ABV UP PARAM F/U: HCPCS | Performed by: FAMILY MEDICINE

## 2019-02-25 PROCEDURE — 84443 ASSAY THYROID STIM HORMONE: CPT

## 2019-02-25 PROCEDURE — 99212 OFFICE O/P EST SF 10 MIN: CPT | Performed by: FAMILY MEDICINE

## 2019-02-25 PROCEDURE — 36415 COLL VENOUS BLD VENIPUNCTURE: CPT

## 2019-02-25 PROCEDURE — 84403 ASSAY OF TOTAL TESTOSTERONE: CPT

## 2019-02-25 PROCEDURE — 1036F TOBACCO NON-USER: CPT | Performed by: FAMILY MEDICINE

## 2019-02-25 RX ORDER — ALUMINUM ZIRCONIUM OCTACHLOROHYDREX GLY 16 G/100G
1 GEL TOPICAL DAILY
Qty: 30 CAPSULE | Refills: 3 | COMMUNITY
Start: 2019-02-25 | End: 2019-05-06 | Stop reason: ALTCHOICE

## 2019-02-25 RX ORDER — ERGOCALCIFEROL 1.25 MG/1
50000 CAPSULE ORAL WEEKLY
Qty: 4 CAPSULE | Refills: 2 | Status: SHIPPED | OUTPATIENT
Start: 2019-02-25 | End: 2019-05-06 | Stop reason: SDUPTHER

## 2019-02-25 ASSESSMENT — ENCOUNTER SYMPTOMS
NAUSEA: 0
BACK PAIN: 0
SHORTNESS OF BREATH: 0
EYE PAIN: 0
PHOTOPHOBIA: 0
ABDOMINAL PAIN: 0
CHOKING: 0
ABDOMINAL DISTENTION: 0
VOMITING: 0
RHINORRHEA: 0
CHEST TIGHTNESS: 0
TROUBLE SWALLOWING: 0
WHEEZING: 0
EYE DISCHARGE: 0
BLOOD IN STOOL: 0
COUGH: 0
SORE THROAT: 0
CONSTIPATION: 0
DIARRHEA: 0

## 2019-02-27 LAB
DHEAS (DHEA SULFATE): 249 UG/DL (ref 65–380)
URINE CULTURE, ROUTINE: NORMAL

## 2019-02-28 LAB
SEX HORMONE BINDING GLOBULIN: 41 NMOL/L (ref 30–135)
TESTOSTERONE FREE-NONMALE: 2.7 PG/ML (ref 0.8–7.4)
TESTOSTERONE TOTAL: 17 NG/DL (ref 20–70)

## 2019-03-01 ENCOUNTER — TELEPHONE (OUTPATIENT)
Dept: INTERNAL MEDICINE CLINIC | Age: 29
End: 2019-03-01

## 2019-03-01 DIAGNOSIS — A59.03 TRICHOMONAL CYSTITIS: Primary | ICD-10-CM

## 2019-03-01 RX ORDER — METRONIDAZOLE 500 MG/1
500 TABLET ORAL 2 TIMES DAILY
Qty: 14 TABLET | Refills: 0 | Status: SHIPPED | OUTPATIENT
Start: 2019-03-01 | End: 2019-03-08

## 2019-03-01 RX ORDER — PHENAZOPYRIDINE HYDROCHLORIDE 100 MG/1
100 TABLET, FILM COATED ORAL 3 TIMES DAILY PRN
Qty: 6 TABLET | Refills: 0 | Status: SHIPPED | OUTPATIENT
Start: 2019-03-01 | End: 2019-03-03

## 2019-05-06 ENCOUNTER — OFFICE VISIT (OUTPATIENT)
Dept: INTERNAL MEDICINE CLINIC | Age: 29
End: 2019-05-06
Payer: COMMERCIAL

## 2019-05-06 VITALS
DIASTOLIC BLOOD PRESSURE: 80 MMHG | HEART RATE: 90 BPM | TEMPERATURE: 98.2 F | BODY MASS INDEX: 41.36 KG/M2 | HEIGHT: 63 IN | WEIGHT: 233.4 LBS | SYSTOLIC BLOOD PRESSURE: 117 MMHG | OXYGEN SATURATION: 97 %

## 2019-05-06 DIAGNOSIS — L02.429 BOIL OF LOWER EXTREMITY: Primary | ICD-10-CM

## 2019-05-06 DIAGNOSIS — L73.2 HIDRADENITIS: ICD-10-CM

## 2019-05-06 DIAGNOSIS — E55.9 VITAMIN D DEFICIENCY: ICD-10-CM

## 2019-05-06 PROCEDURE — 1036F TOBACCO NON-USER: CPT | Performed by: FAMILY MEDICINE

## 2019-05-06 PROCEDURE — 99213 OFFICE O/P EST LOW 20 MIN: CPT | Performed by: FAMILY MEDICINE

## 2019-05-06 PROCEDURE — G8427 DOCREV CUR MEDS BY ELIG CLIN: HCPCS | Performed by: FAMILY MEDICINE

## 2019-05-06 PROCEDURE — G8417 CALC BMI ABV UP PARAM F/U: HCPCS | Performed by: FAMILY MEDICINE

## 2019-05-06 RX ORDER — DOXYCYCLINE HYCLATE 100 MG/1
100 CAPSULE ORAL 2 TIMES DAILY
Qty: 60 CAPSULE | Refills: 0 | Status: SHIPPED | OUTPATIENT
Start: 2019-05-06 | End: 2019-06-05

## 2019-05-06 RX ORDER — ERGOCALCIFEROL 1.25 MG/1
50000 CAPSULE ORAL WEEKLY
Qty: 4 CAPSULE | Refills: 0 | Status: SHIPPED | OUTPATIENT
Start: 2019-05-06 | End: 2022-07-01

## 2019-05-06 ASSESSMENT — ENCOUNTER SYMPTOMS
EYE PAIN: 0
BLOOD IN STOOL: 0
SORE THROAT: 0
CHEST TIGHTNESS: 0
PHOTOPHOBIA: 0
ABDOMINAL DISTENTION: 0
ABDOMINAL PAIN: 0
COUGH: 0
NAUSEA: 0
WHEEZING: 0
DIARRHEA: 0
CHOKING: 0
RHINORRHEA: 0
CONSTIPATION: 0
SHORTNESS OF BREATH: 0
EYE DISCHARGE: 0
TROUBLE SWALLOWING: 0
BACK PAIN: 0
VOMITING: 0

## 2019-05-06 NOTE — PROGRESS NOTES
[facility]    Attending Physician Statement:  David Guan, DO    I have discussed the case, including pertinent history and exam findings with the resident. I have seen and examined the patient and the key elements of the encounter have been performed by me. I agree with the assessment, plan and orders as documented by the resident. Patient here for routine follow up of medical problems. Remainder of medical problems as per resident note.

## 2019-05-06 NOTE — PROGRESS NOTES
Subjective:  33 y.o. female with a PMH of Hidradenitis Suppurativa, Vit D def and other co-morbidities who presents in office today for an acute care visit complaining of 2 lesions on the back of her thigh. Patient reports that 1 lesion began draining 3 days ago. The 2nd lesion was raised and inflamed. Review of Systems   Constitutional: Negative for activity change, appetite change, chills, fever and unexpected weight change. HENT: Negative for congestion, ear pain, hearing loss, rhinorrhea, sore throat and trouble swallowing. Eyes: Negative for photophobia, pain, discharge and visual disturbance. Respiratory: Negative for cough, choking, chest tightness, shortness of breath and wheezing. Cardiovascular: Negative for chest pain, palpitations and leg swelling. Gastrointestinal: Negative for abdominal distention, abdominal pain, blood in stool, constipation, diarrhea, nausea and vomiting. Endocrine: Negative for cold intolerance, heat intolerance, polydipsia and polyuria. Genitourinary: Negative for decreased urine volume, dysuria, flank pain, frequency, hematuria and urgency. Musculoskeletal: Negative for arthralgias, back pain, joint swelling, neck pain and neck stiffness. Skin: Positive for wound (1 draining for 1 week; 2nd lesion ). Negative for pallor and rash. Neurological: Negative for dizziness, tremors, weakness, light-headedness, numbness and headaches. Hematological: Does not bruise/bleed easily. Psychiatric/Behavioral: Negative for confusion and suicidal ideas. The patient is not nervous/anxious. Objective:  Vitals:    05/06/19 1422   BP: 117/80   Pulse: 90   Temp: 98.2 °F (36.8 °C)   SpO2: 97%       Physical Exam   Constitutional: She is oriented to person, place, and time. She appears well-developed and well-nourished. No distress. HENT:   Head: Normocephalic and atraumatic.    Right Ear: External ear normal.   Left Ear: External ear normal.   Nose: Nose normal. UROBILINOGEN 0.2 02/25/2019    BILIRUBINUR Negative 02/25/2019     No results found for: PSA, CEA, , [id number],   Assessment and Plan:  George Jacobsen was seen today for follow-up, results and health maintenance. Diagnoses and all orders for this visit:    Boil of lower extremity  -     doxycycline hyclate (VIBRAMYCIN) 100 MG capsule; Take 1 capsule by mouth 2 times daily    Hidradenitis    Vitamin D deficiency  -     vitamin D (ERGOCALCIFEROL) 89407 units CAPS capsule; Take 1 capsule by mouth once a week         Return in about 1 month (around 6/3/2019) for Lesion check. .    Patient may come in sooner if needed for medical concerns. Patient advised tocall at any time to cancel or re-schedule or for any questions/concerns. Patient was seen and discussed with attending physician, Dr. Amol Garrido.     Megan Herman DO PGY3 [facility]  05/06/19  3:00 PM

## 2020-03-02 ENCOUNTER — HOSPITAL ENCOUNTER (EMERGENCY)
Age: 30
Discharge: HOME | End: 2020-03-02
Payer: COMMERCIAL

## 2020-03-02 VITALS
WEIGHT: 233 LBS | OXYGEN SATURATION: 98 % | DIASTOLIC BLOOD PRESSURE: 80 MMHG | HEART RATE: 60 BPM | BODY MASS INDEX: 41.29 KG/M2 | SYSTOLIC BLOOD PRESSURE: 120 MMHG | RESPIRATION RATE: 16 BRPM | TEMPERATURE: 98.3 F | HEIGHT: 63 IN

## 2020-03-02 PROCEDURE — 99282 EMERGENCY DEPT VISIT SF MDM: CPT

## 2020-03-02 PROCEDURE — 12001 RPR S/N/AX/GEN/TRNK 2.5CM/<: CPT

## 2020-03-02 ASSESSMENT — PAIN SCALES - GENERAL: PAINLEVEL_OUTOF10: 8

## 2020-03-02 ASSESSMENT — PAIN DESCRIPTION - DESCRIPTORS: DESCRIPTORS: BURNING

## 2020-03-02 ASSESSMENT — PAIN DESCRIPTION - PROGRESSION: CLINICAL_PROGRESSION: GRADUALLY WORSENING

## 2020-03-02 ASSESSMENT — PAIN DESCRIPTION - ORIENTATION: ORIENTATION: RIGHT

## 2020-03-02 ASSESSMENT — PAIN DESCRIPTION - PAIN TYPE: TYPE: ACUTE PAIN

## 2020-03-02 ASSESSMENT — PAIN DESCRIPTION - LOCATION: LOCATION: FINGER (COMMENT WHICH ONE)

## 2020-03-02 ASSESSMENT — PAIN DESCRIPTION - FREQUENCY: FREQUENCY: CONTINUOUS

## 2020-03-02 NOTE — ED PROVIDER NOTES
---------------------------   The nursing notes within the ED encounter and vital signs as below have been reviewed. /80   Pulse 60   Temp 98.3 °F (36.8 °C) (Oral)   Resp 16   Ht 5' 3\" (1.6 m)   Wt 233 lb (105.7 kg)   SpO2 98%   BMI 41.27 kg/m²   Oxygen Saturation Interpretation: Normal      ---------------------------------------------------PHYSICAL EXAM--------------------------------------      Constitutional/General: Alert and oriented x3, well appearing, non toxic in NAD  Head: NC/AT  Eyes: PERRL, EOMI  Mouth: Oropharynx clear, handling secretions, no trismus  Neck: Supple, full ROM, no meningeal signs  Pulmonary: Lungs clear to auscultation bilaterally, no wheezes, rales, or rhonchi. Not in respiratory distress  Cardiovascular:  Regular rate and rhythm, no murmurs, gallops, or rubs. 2+ distal pulses  Abdomen: Soft, non tender, non distended,   Extremities: Moves all extremities x 4. Warm and well perfused, superficial avulsion of skin flap to the volar surface at the right third finger. Bleeding is currently controlled. Has full range of motion with flexion extension. Skin: warm and dry without rash  Neurologic: GCS 15,  Psych: Normal Affect      LACERATION REPAIR  PROCEDURE NOTE:  Unless otherwise indicated, this procedure was done or directly supervised by the ED attending. Performed By: Kimberly Nieves CNP. Laceration #: 1. Location: volar surface right 3rd finger  Length: 1.0 cm. The wound area was cleansend with shur-clens and draped in a sterile fashion. The wound area was anesthetized with not required. WOUND COMPLEXITY:    Debridement: None. Undermining: None. Wound Margins Revised: None. Flaps Aligned: yes. The wound was explored with the following results no foreign body or tendon injury seen. The wound was closed with Dermabond.   Dressing:  No dressing was placed.          ------------------------------ ED COURSE/MEDICAL DECISION MAKING----------------------  Medications

## 2020-03-04 ENCOUNTER — HOSPITAL ENCOUNTER (EMERGENCY)
Age: 30
Discharge: HOME | End: 2020-03-04
Payer: COMMERCIAL

## 2020-03-04 VITALS
HEIGHT: 63 IN | SYSTOLIC BLOOD PRESSURE: 132 MMHG | WEIGHT: 233 LBS | BODY MASS INDEX: 41.29 KG/M2 | HEART RATE: 90 BPM | DIASTOLIC BLOOD PRESSURE: 88 MMHG | RESPIRATION RATE: 16 BRPM | TEMPERATURE: 98.3 F | OXYGEN SATURATION: 98 %

## 2020-03-04 PROCEDURE — 99282 EMERGENCY DEPT VISIT SF MDM: CPT

## 2020-03-05 NOTE — ED PROVIDER NOTES
[facility]         Department of Emergency Medicine   ED  Provider Note  Admit Date/RoomTime: 3/4/2020  8:39 PM  ED Room: 11/11   Chief Complaint   Wound Check (laceration to right hand, 3rd digit on Monday. states the skin glue that was put on is coming off, wants wound checked)    History of Present Illness   Source of history provided by:  patient. History/Exam Limitations: none. Samuel Conti is a 26 y.o. old female who has a past medical history of:   Past Medical History:   Diagnosis Date    Depression     Hidradenitis     Hyperlipidemia     presents to the emergency department by private vehicle, for evaluation of laceration to the 3rd middle finger located on ulnar aspect had Dermabond placed on 3/2/2020 is coming off. She is right-hand dominant she denies any redness, fevers or pain. The wound is / has clean, dry, no drainage and healing. Prior Treatment:     []   Sutured      []   Stapled      []   Packing      []     ATB's: None     ROS    Pertinent positives and negatives are stated within HPI, all other systems reviewed and are negative. Past Surgical History:   Procedure Laterality Date    OTHER SURGICAL HISTORY      EXCISION OF LEFT AXILLARY HIDRADENITIS    TONSILLECTOMY  2008    WISDOM TOOTH EXTRACTION     Social History:  reports that she quit smoking about 4 years ago. Her smoking use included cigarettes. She has never used smokeless tobacco. She reports current alcohol use. She reports that she does not use drugs. Family History: family history includes Brain Cancer in her paternal grandfather; Cancer in her paternal uncle; Heart Attack in her maternal grandmother and paternal grandmother; High Blood Pressure in her mother; Kidney Disease in her father; No Known Problems in her maternal grandfather; Osteoarthritis in her mother. Allergies: Patient has no known allergies.     Physical Exam           ED Triage Vitals   BP Temp Temp Source Pulse Resp SpO2 covered at all times while at work. Additionally she was given specific instructions to return for any signs of infection. Patient had Steri-Strips to reinforce previous Dermabond area. Instructed to keep wound out of water and discussed signs and symptoms warranting immediate return. Counseling: The emergency provider has spoken with the patient and discussed todays results, in addition to providing specific details for the plan of care and counseling regarding the diagnosis and prognosis. Questions are answered at this time and they are agreeable with the plan. Assessment     1. Laceration of right middle finger without foreign body without damage to nail, subsequent encounter      Plan   Disposition: Discharge to home  Patient condition is good    New Medications     Discharge Medication List as of 3/4/2020  9:32 PM        Electronically signed by AMERICO Arora CNP   DD: 3/4/20  **This report was transcribed using voice recognition software. Every effort was made to ensure accuracy; however, inadvertent computerized transcription errors may be present.   END OF ED PROVIDER NOTE      AMERICO Arora CNP  03/07/20 1427

## 2020-04-08 ENCOUNTER — OFFICE VISIT (OUTPATIENT)
Dept: INTERNAL MEDICINE CLINIC | Age: 30
End: 2020-04-08
Payer: COMMERCIAL

## 2020-04-08 VITALS
HEIGHT: 63 IN | HEART RATE: 85 BPM | BODY MASS INDEX: 43.05 KG/M2 | TEMPERATURE: 97.8 F | WEIGHT: 243 LBS | DIASTOLIC BLOOD PRESSURE: 88 MMHG | SYSTOLIC BLOOD PRESSURE: 128 MMHG | OXYGEN SATURATION: 98 %

## 2020-04-08 PROCEDURE — 99213 OFFICE O/P EST LOW 20 MIN: CPT | Performed by: FAMILY MEDICINE

## 2020-04-08 PROCEDURE — G8417 CALC BMI ABV UP PARAM F/U: HCPCS | Performed by: FAMILY MEDICINE

## 2020-04-08 PROCEDURE — G8427 DOCREV CUR MEDS BY ELIG CLIN: HCPCS | Performed by: FAMILY MEDICINE

## 2020-04-08 PROCEDURE — 1036F TOBACCO NON-USER: CPT | Performed by: FAMILY MEDICINE

## 2020-04-08 RX ORDER — MELOXICAM 15 MG/1
15 TABLET ORAL DAILY
Qty: 30 TABLET | Refills: 0 | Status: SHIPPED
Start: 2020-04-08 | End: 2022-07-01

## 2020-04-08 NOTE — PROGRESS NOTES
Subjective:  26 y.o. female who presents to the office today with chief complaint:  Chief Complaint   Patient presents with    Finger Pain     Lump in the left pinky finger on palm side     Finger discomfort: Noticed 3 weeks ago a bump on MCP of 5th digit of left hand. Currently has some discomfort that is worsening that radiates to wrist and elbow. Will occasionally have tingling and numbness. Has become \"stuck\" twice; relieved by rest and epsom salts. Never had this in the past.     Health Maintenance Due   Topic Date Due    Varicella vaccine (1 of 2 - 2-dose childhood series) 08/13/1990         Review of Systems    Review of Systems: All bolded are positive, all others are negative. General:  Fever, chills, diaphoresis, fatigue, malaise, night sweats, weight loss  Psychological:  Anxiety, disorientation, hallucinations. ENT:  Epistaxis, headaches, vertigo, visual changes. Cardiovascular:  Chest pain, irregular heartbeats, palpitations, paroxysmal nocturnal dyspnea. Respiratory:  Shortness of breath, coughing, sputum production, hemoptysis, wheezing, orthopnea. Gastrointestinal:  Nausea, vomiting, diarrhea, heartburn, constipation, abdominal pain, hematemesis, hematochezia, melena, acholic stools  Genito-Urinary:  Dysuria, urgency, frequency, hematuria  Musculoskeletal:  Joint pain left fifth MCP, joint stiffness, joint swelling, muscle pain  Neurology:  Headache, focal neurological deficits, weakness, numbness, paresthesia  Derm:  Rashes, ulcers, excoriations, bruising  Extremities:  Decreased ROM, peripheral edema, mottling      Objective:  Vitals:    04/08/20 1441   BP: 128/88   Pulse:    Temp:    SpO2:      Physical Exam  Musculoskeletal:      Comments: Full range of motion of left hand wrist and elbow is noted. 5/5 strength left hand, wrist, elbow. Is a 2 to 3 mm in diameter nodular lesion noted on the fifth MCP of the left hand.                                  CBC   Result Value Ref Range    WBC

## 2020-06-12 ENCOUNTER — TELEPHONE (OUTPATIENT)
Dept: ORTHOPEDIC SURGERY | Age: 30
End: 2020-06-12

## 2022-04-04 ENCOUNTER — HOSPITAL ENCOUNTER (OUTPATIENT)
Dept: MRI IMAGING | Age: 32
Discharge: HOME | End: 2022-04-06
Payer: COMMERCIAL

## 2022-04-04 DIAGNOSIS — H47.10 PAPILLEDEMA OF BOTH EYES: ICD-10-CM

## 2022-04-04 PROCEDURE — A9577 INJ MULTIHANCE: HCPCS | Performed by: RADIOLOGY

## 2022-04-04 PROCEDURE — 6360000004 HC RX CONTRAST MEDICATION: Performed by: RADIOLOGY

## 2022-04-04 PROCEDURE — 70543 MRI ORBT/FAC/NCK W/O &W/DYE: CPT

## 2022-04-04 RX ADMIN — GADOBENATE DIMEGLUMINE 20 ML: 529 INJECTION, SOLUTION INTRAVENOUS at 10:59

## 2022-04-09 ENCOUNTER — HOSPITAL ENCOUNTER (OUTPATIENT)
Dept: MRI IMAGING | Age: 32
Discharge: HOME | End: 2022-04-11
Payer: COMMERCIAL

## 2022-04-09 DIAGNOSIS — H47.10 PAPILLEDEMA: ICD-10-CM

## 2022-04-09 PROCEDURE — 70553 MRI BRAIN STEM W/O & W/DYE: CPT

## 2022-04-09 PROCEDURE — 6360000004 HC RX CONTRAST MEDICATION: Performed by: RADIOLOGY

## 2022-04-09 PROCEDURE — A9577 INJ MULTIHANCE: HCPCS | Performed by: RADIOLOGY

## 2022-04-09 RX ADMIN — GADOBENATE DIMEGLUMINE 20 ML: 529 INJECTION, SOLUTION INTRAVENOUS at 08:31

## 2022-06-10 ENCOUNTER — OFFICE VISIT (OUTPATIENT)
Dept: NEUROLOGY | Age: 32
End: 2022-06-10
Payer: COMMERCIAL

## 2022-06-10 VITALS
DIASTOLIC BLOOD PRESSURE: 87 MMHG | OXYGEN SATURATION: 99 % | SYSTOLIC BLOOD PRESSURE: 122 MMHG | HEART RATE: 66 BPM | TEMPERATURE: 97.3 F

## 2022-06-10 DIAGNOSIS — G93.2 INTRACRANIAL HYPERTENSION: ICD-10-CM

## 2022-06-10 DIAGNOSIS — H46.9 OPTIC NEURITIS, LEFT: Primary | ICD-10-CM

## 2022-06-10 PROCEDURE — 99204 OFFICE O/P NEW MOD 45 MIN: CPT | Performed by: NURSE PRACTITIONER

## 2022-06-10 PROCEDURE — G8427 DOCREV CUR MEDS BY ELIG CLIN: HCPCS | Performed by: NURSE PRACTITIONER

## 2022-06-10 PROCEDURE — 1036F TOBACCO NON-USER: CPT | Performed by: NURSE PRACTITIONER

## 2022-06-10 PROCEDURE — G8421 BMI NOT CALCULATED: HCPCS | Performed by: NURSE PRACTITIONER

## 2022-06-10 NOTE — PROGRESS NOTES
[facility]. Desiree Tucker M.D., F.A.C.P. Nettie Rodríguez, DESHAUN, APRN, CNS  Elenita Jade. Bishnu Perera, MSN, APRN-FNP-C  Bharati Peters MSN, APRN, FNP-C  Naila RUEDA PA-C  [address] MSN, APRN, FNP-C  [address] [place] [address]  TRIST[place] garcia, [address]  Phone: [phone]  Fax: [phone]       Chante Posadas is a 27 y.o. right handed female     Presents to neurology clinic today for evaluation management of abnormal MRI brain    Patient is a decent historian and provides her history. Past Medical History:     Past Medical History:   Diagnosis Date    ADHD     Depression     Hidradenitis     Hyperlipidemia     Sciatica of left side        Past Surgical History:       Past Surgical History:   Procedure Laterality Date    OTHER SURGICAL HISTORY      EXCISION OF LEFT AXILLARY HIDRADENITIS    TONSILLECTOMY  2008    WISDOM TOOTH EXTRACTION         Allergies:       Patient has no known allergies. Medications:     Prior to Admission medications    Medication Sig Start Date End Date Taking? Authorizing Provider   meloxicam (MOBIC) 15 MG tablet Take 1 tablet by mouth daily  Patient not taking: Reported on 6/10/2022 4/8/20   Mitchel Johnson DO   vitamin D (ERGOCALCIFEROL) 77680 units CAPS capsule Take 1 capsule by mouth once a week  Patient not taking: Reported on 6/10/2022 5/6/19   Nadia Armijo DO       Social History:        reports that she quit smoking about 6 years ago. Her smoking use included cigarettes. She has never used smokeless tobacco. She reports current alcohol use. She reports current drug use. Drug: Marijuana Curlie Opal). Patient is single and has no children. Patient works at [employer].     Review of Systems:     (+) Vision changes  No chest pain or palpitations  No SOB  No vertigo, lightheadedness or loss of consciousness  No falls, tripping or stumbling  No incontinence of bowels or bladder  No itching or bruising appreciated  No numbness, tingling or focal arm/leg weakness    ROS is otherwise negative    Family History:     Family History   Problem Relation Age of Onset    High Blood Pressure Mother     Osteoarthritis Mother     Kidney Disease Father     Heart Attack Maternal Grandmother     No Known Problems Maternal Grandfather     Heart Attack Paternal Grandmother     Brain Cancer Paternal Grandfather     Cancer Paternal Uncle         History of Present Illness:     Patient went to a routine eye exam in a [facility] optometry center in February or March. 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 Patient began to notice that she had difficulty focusing even with new prescription glasses prior to that exam in March. Patient's peripheral vision to the left is severely impaired. Patient also has blurred vision to the left eye. Close up patient is able to see words however further back words on the page or posterior look like foreign language and that letters are combined. Patient's right eye continues to have perfect vision. Patient follow back up with her [facility] in April and had more testing completed about 1 to 2 weeks after that. Patient states her vision has not changed at all over this time period. Patient says nothing makes this better or worse. Patient had MRI of the orbits completed in April which suggest possible idiopathic intracranial hypertension. Patient was sent for MRI of the brain few days later on April 9 which was consistent with idiopathic intracranial hypertension. Patient does not have a primary care and it is taken this long to get into see neurology. Patient denies headache, speech or swallowing difficulty, focal weakness, numbness or tingling of extremities. Patient sleeps 4 to 7 hours each night although sleep is very broken. Patient eats 2-3 meals a day. Patient drinks tea 3-4 times per week. Also drinks lemonade frequently. Patient drinks 1 to 1-1/2 L of water per day. Patient reports a mild stress level. Patient does not exercise. Of note patient's father passed away in 2019. Patient's 2 uncles passed away in 2016 back to back. Patient states she has no family here locally except her mother. Objective:       /87 (Site: Right Upper Arm)   Pulse 66   Temp 97.3 °F (36.3 °C)   SpO2 99%     General appearance: alert, appears stated age, cooperative and in no distress  Head: normocephalic, without obvious abnormality, atraumatic  Eyes: conjunctivae/corneas clear; no drainage  Neck: supple, symmetrical, trachea midline and thyroid not enlarged  Back: symmetric, no curvature. ROM normal.   Lungs: clear to auscultation bilaterally, unlabored breaths on room air  Heart: regular rate and rhythm, S1, S2 normal, no murmur  Abdomen: soft, non-tender; bowel sounds normal  Extremities: normal, atraumatic, no cyanosis or edema  Pulses: 2+ and symmetric  Skin:  color, texture, turgor normal--no rashes or lesions      Mental Status: alert and oriented x 4, follows commands well, very conversant, easily distracted    Appropriate attention/concentration  Intact fundus of knowledge  Memories intact    Speech: no dysarthria  Language: no aphasias--- repetition, and object identification intact; reading complicated due to vision difficulty    Cranial Nerves:  I: smell  intact   II: visual acuity     II: visual fields Full finger counting bilaterally    Peripheral vision loss to left eye.   Right visual fields intact    No red desaturation   II: pupils PERRL    No Asif's pupil   III,VII: ptosis None   III,IV,VI: extraocular muscles  EOMI without nystagmus   V: mastication Normal   V: facial light touch sensation  Normal   V,VII: corneal reflex     VII: facial muscle function - upper  Normal   VII: facial muscle function - lower Normal   VIII: hearing Normal   IX: soft palate elevation  Normal   IX,X: gag reflex    XI: trapezius strength  5/5 XI: sternocleidomastoid strength 5/5   XI: neck extension strength  5/5   XII: tongue strength  Normal     Motor:  Strong bilateral handgrips  5/5 throughout  Normal bulk and tone  No drift   No abnormal movements    Sensory:  LT and PP normal  Vibration normal    Coordination:   FN, FFM and JOON normal  HS normal    Gait:  Normal  Walks well on toes, heels, and tandem    DTR:   Right Brachioradialis reflex 1+  Left Brachioradialis reflex 1+  Right Biceps reflex 1+  Left Biceps reflex 1+  Right Triceps reflex 1+  Left Triceps reflex 1+  Right Quadriceps reflex 1+  Left Quadriceps reflex 1+  Right Achilles reflex 1+  Left Achilles reflex 1+    No Babinskis  No Otto's    No other pathological reflexes    Laboratory/Radiology:     CBC:   Lab Results   Component Value Date    WBC 14.0 02/25/2019    RBC 4.49 02/25/2019    HGB 13.6 02/25/2019    HCT 40.8 02/25/2019    MCV 90.9 02/25/2019    MCH 30.3 02/25/2019    MCHC 33.3 02/25/2019    RDW 14.0 02/25/2019     02/25/2019    MPV 9.3 02/25/2019     Lab Results   Component Value Date     02/25/2019    K 4.0 02/25/2019     02/25/2019    CO2 25 02/25/2019    BUN 11 02/25/2019    CREATININE 0.6 02/25/2019    GLUCOSE 71 (L) 02/25/2019    CALCIUM 9.1 02/25/2019    PROT 7.9 02/25/2019    LABALBU 4.1 02/25/2019    BILITOT 0.2 02/25/2019    ALKPHOS 86 02/25/2019    AST 22 02/25/2019    ALT 27 02/25/2019    LABGLOM >60 02/25/2019    GFRAA >60 02/25/2019     MRI brain with and without contrast 4/9/2022:  Prominent CSF within the optic nerve sheaths and mild flattening of the   superior margin of the pituitary gland which is nonspecific but can be seen   in the setting of idiopathic intracranial hypertension, in the appropriate   clinical setting.       No acute intracranial process identified. MRI orbits face neck with and without contrast 4/4/2022  1.  Findings suggestive of possible idiopathic intracranial hypertension   (flattening of the posterior sclera at the levels of the optic nerve disc   heads, mild prominence of the CSF surrounding the optic nerves and partially   empty sella). 2. No mass, signal abnormality or abnormal enhancement is seen in the orbits.           All pertinent labs and images were personally reviewed at the time of this visit    Assessment:     Idiopathic intracranial hypertension   Patient found to have flattening of the pituitary gland and posterior sclera at levels of optic nerve disc with mild prominence of CSF surrounding optic nerves on MRIs   Stat lumbar puncture through ED or stat with IR to record opening pressure   Patient's vision to the left remains blurred, loss of peripheral visual fields with difficulty with reading as words run together and look forward to her. Right eye vision is intact    Plan:     LP as soon as possible  PT/INR ordered today  Record opening pressure  CSF studies ordered today  To start Diamox after LP most likely  Referral for PCP next visit  Ophthalmology evaluation soon  Report to office in 1 month  Call with any issues    AMERICO Lawton  3:35 PM  6/10/2022    I spent 52 minutes with this patient obtaining the HPI and discussing the exam with greater than 50% of the time providing counseling and education on medications and other treatment plans. All questions were answered prior to leaving my office.

## 2022-06-20 ENCOUNTER — HOSPITAL ENCOUNTER (OUTPATIENT)
Dept: PREADMISSION TESTING | Age: 32
Discharge: HOME | End: 2022-06-20

## 2022-06-21 ENCOUNTER — HOSPITAL ENCOUNTER (OUTPATIENT)
Age: 32
Discharge: HOME | End: 2022-06-21
Payer: COMMERCIAL

## 2022-06-21 ENCOUNTER — HOSPITAL ENCOUNTER (OUTPATIENT)
Dept: INTERVENTIONAL RADIOLOGY/VASCULAR | Age: 32
Discharge: HOME | End: 2022-06-21
Payer: COMMERCIAL

## 2022-06-21 VITALS
WEIGHT: 243 LBS | HEART RATE: 63 BPM | SYSTOLIC BLOOD PRESSURE: 119 MMHG | OXYGEN SATURATION: 98 % | BODY MASS INDEX: 43.05 KG/M2 | TEMPERATURE: 98 F | DIASTOLIC BLOOD PRESSURE: 60 MMHG | HEIGHT: 63 IN | RESPIRATION RATE: 16 BRPM

## 2022-06-21 DIAGNOSIS — H46.9 OPTIC NEURITIS, LEFT: ICD-10-CM

## 2022-06-21 DIAGNOSIS — G93.2 INTRACRANIAL HYPERTENSION: ICD-10-CM

## 2022-06-21 LAB
APPEARANCE CSF: CLEAR
COLOR CSF: COLORLESS
CRYPTOCOCCUS NEOFORMANS/GATTII CSF BY PCR: NOT DETECTED
CYTOMEGALOVIRUS CSF BY PCR: NOT DETECTED
ENTEROVIRUS CSF BY PCR: NOT DETECTED
ESCHERICHIA COLI K1 CSF BY PCR: NOT DETECTED
GLUCOSE, CSF: 65 MG/DL (ref 40–70)
HAEMOPHILUS INFLUENZAE CSF BY PCR: NOT DETECTED
HCG QUALITATIVE: NEGATIVE
HCT VFR BLD CALC: 39.6 % (ref 34–48)
HEMOGLOBIN: 13.1 G/DL (ref 11.5–15.5)
HERPES SIMPLEX VIRUS 1 CSF BY PCR: NOT DETECTED
HERPES SIMPLEX VIRUS 2 CSF BY PCR: NOT DETECTED
HUMAN HERPESVIRUS 6 CSF BY PCR: NOT DETECTED
HUMAN PARECHOVIRUS CSF BY PCR: NOT DETECTED
INR BLD: 1
LISTERIA MONOCYTOGENES CSF BY PCR: NOT DETECTED
MONOCYTE, CSF: 100 % (ref 10–70)
NEISSERIA MENINGITIDIS CSF BY PCR: NOT DETECTED
NEUTROPHILS, CSF: 0 % (ref 0–10)
PLATELET # BLD: 422 E9/L (ref 130–450)
PROTEIN CSF: 25 MG/DL (ref 15–40)
PROTHROMBIN TIME: 11.9 SEC (ref 9.3–12.4)
RBC CSF: <2000 /UL
STREPTOCOCCUS AGALACTIAE CSF BY PCR: NOT DETECTED
STREPTOCOCCUS PNEUMONIAE CSF BY PCR: NOT DETECTED
TUBE NUMBER CSF: ABNORMAL
VARICELLA ZOSTER VIRUS CSF BY PCR: NOT DETECTED
WBC CSF: <3 /UL (ref 0–2)

## 2022-06-21 PROCEDURE — 82945 GLUCOSE OTHER FLUID: CPT

## 2022-06-21 PROCEDURE — 62328 DX LMBR SPI PNXR W/FLUOR/CT: CPT

## 2022-06-21 PROCEDURE — 87070 CULTURE OTHR SPECIMN AEROBIC: CPT

## 2022-06-21 PROCEDURE — 82040 ASSAY OF SERUM ALBUMIN: CPT

## 2022-06-21 PROCEDURE — 89051 BODY FLUID CELL COUNT: CPT

## 2022-06-21 PROCEDURE — 82784 ASSAY IGA/IGD/IGG/IGM EACH: CPT

## 2022-06-21 PROCEDURE — 7100000010 HC PHASE II RECOVERY - FIRST 15 MIN

## 2022-06-21 PROCEDURE — 7100000011 HC PHASE II RECOVERY - ADDTL 15 MIN

## 2022-06-21 PROCEDURE — 84157 ASSAY OF PROTEIN OTHER: CPT

## 2022-06-21 PROCEDURE — 87483 CNS DNA AMP PROBE TYPE 12-25: CPT

## 2022-06-21 PROCEDURE — 84703 CHORIONIC GONADOTROPIN ASSAY: CPT

## 2022-06-21 PROCEDURE — 85027 COMPLETE CBC AUTOMATED: CPT

## 2022-06-21 PROCEDURE — 87205 SMEAR GRAM STAIN: CPT

## 2022-06-21 PROCEDURE — 83916 OLIGOCLONAL BANDS: CPT

## 2022-06-21 PROCEDURE — 82042 OTHER SOURCE ALBUMIN QUAN EA: CPT

## 2022-06-21 PROCEDURE — 85610 PROTHROMBIN TIME: CPT

## 2022-06-21 PROCEDURE — 36415 COLL VENOUS BLD VENIPUNCTURE: CPT

## 2022-06-21 NOTE — BRIEF OP NOTE
Diagnosis: Possible ICH    Procedure: Lumbar puncture    Findings: Opening pressure: 30, closing pressure: 23     Specimen: 25 cc CSF removed    EBL: Minimal    Complication: None apparent    Plan: Bedrest for 2 hrs

## 2022-06-21 NOTE — PROGRESS NOTES
Pt came to special procedures for lumbar puncture. Procedure was explained and questions were answered    Patients respirations easy, even and unlabored. Patient positioned, secured and prepped on table. 1542  Start procedure 125/82  69  18  97% on room air    prone     1613  End procedure 133/95  64  18  98% on room air   prone    Opening pressure 30    Closing pressure 22    25 cc of fluid removed       Patient tolerated procedure. Bandaid applied to mid back. Vital signs stable. Specimen taken to lab. Nurse to nurse called to [facility] spoke with Viviano Lesches     Pt transported back to [facility].

## 2022-06-23 ENCOUNTER — HOSPITAL ENCOUNTER (EMERGENCY)
Age: 32
Discharge: HOME | End: 2022-06-23
Payer: COMMERCIAL

## 2022-06-23 ENCOUNTER — APPOINTMENT (OUTPATIENT)
Dept: GENERAL RADIOLOGY | Age: 32
End: 2022-06-23
Payer: COMMERCIAL

## 2022-06-23 VITALS
RESPIRATION RATE: 16 BRPM | HEART RATE: 77 BPM | SYSTOLIC BLOOD PRESSURE: 123 MMHG | DIASTOLIC BLOOD PRESSURE: 76 MMHG | TEMPERATURE: 98.6 F | OXYGEN SATURATION: 98 %

## 2022-06-23 DIAGNOSIS — M25.561 ACUTE PAIN OF RIGHT KNEE: ICD-10-CM

## 2022-06-23 DIAGNOSIS — M25.461 EFFUSION OF RIGHT KNEE: Primary | ICD-10-CM

## 2022-06-23 PROCEDURE — 99283 EMERGENCY DEPT VISIT LOW MDM: CPT

## 2022-06-23 PROCEDURE — 73562 X-RAY EXAM OF KNEE 3: CPT

## 2022-06-23 ASSESSMENT — PAIN SCALES - GENERAL: PAINLEVEL_OUTOF10: 9

## 2022-06-23 ASSESSMENT — PAIN - FUNCTIONAL ASSESSMENT: PAIN_FUNCTIONAL_ASSESSMENT: 0-10

## 2022-06-23 ASSESSMENT — PAIN DESCRIPTION - LOCATION: LOCATION: KNEE

## 2022-06-23 ASSESSMENT — PAIN DESCRIPTION - ORIENTATION: ORIENTATION: RIGHT

## 2022-06-23 NOTE — ED NOTES
Department of Emergency Medicine  FIRST PROVIDER TRIAGE NOTE             Independent MLP           6/23/22  4:18 PM EDT    Date of Encounter: 6/23/22   MRN: [id number]      HPI: Becky Tong is a 27 y.o. female who presents to the ED for Knee Pain (right, states feeks out of place)  Patient reports that she not have an injury that she can recall however on Monday her right knee started to hurt. She states that it is gotten progressively more painful and swollen over the week. She states that she has never had injury to the area or seen orthopedics as far. ROS: Negative for cp, sob, abd pain, back pain, fever, cough, vomiting, diarrhea, urinary complaints, rash, headache or dizziness. PE: Gen Appearance/Constitutional: alert  CV: regular rate  Pulm: CTA bilat  GI: soft and NT     Initial Plan of Care: All treatment areas with department are currently occupied. Plan to order/Initiate the following while awaiting opening in ED: imaging studies.   Initiate Treatment-Testing, Proceed toTreatment Area When Bed Available for ED Attending/MLP to Continue Care    Electronically signed by NISA Johnson   DD: 6/23/22         Linda Johnson  06/23/22 [id number]

## 2022-06-23 NOTE — ED PROVIDER NOTES
[address]  Department of Emergency Medicine   ED  Encounter Note  Admit Date/RoomTime: 2022  4:53 PM  ED Room:     NAME: Chante Posadas  : 1990  MRN: [id number]     Chief Complaint:  Knee Pain (right, states feeks out of place)    History of Present Illness       Chante Posadas is a 27 y.o. old female who presents to the emergency department by private vehicle, for non-traumatic pain located right knee diffusly to right knee  which occured 1 week(s) prior to arrival.  The complaint is due to no known cause. Since onset the symptoms have been gradually worsening. Patient has no prior history of pain/injury with regards to today's visit. Her pain is aggraveated by pressure on or palpation of painful area or walking and relieved by nothing, as no treatment has been provided prior to this visit. Her weight bearing ability is: limited secondary to discomfort. She denies any headache, chest pain, shortness of breath, fever, chills, nausea, vomiting, diarrhea, numbness or tingling, or previous surgeries to the area. .      ROS   Pertinent positives and negatives are stated within HPI, all other systems reviewed and are negative. Past Medical History:  has a past medical history of ADHD, Depression, Hidradenitis, Hyperlipidemia, and Sciatica of left side. Surgical History:  has a past surgical history that includes Tonsillectomy (); other surgical history; and [place] tooth extraction. Social History:  reports that she quit smoking about 6 years ago. Her smoking use included cigarettes. She has never used smokeless tobacco. She reports current alcohol use. She reports current drug use. Drug: Marijuana Curlie Opal). Family History: family history includes Brain Cancer in her paternal grandfather; Cancer in her paternal uncle;  Heart Attack in her maternal grandmother and paternal grandmother; High Blood Pressure in her mother; Kidney Disease in her father; No Known Problems in her maternal grandfather; Osteoarthritis in her mother. Allergies: Patient has no known allergies. Physical Exam   Oxygen Saturation Interpretation: Normal.        ED Triage Vitals [06/23/22 1616]   BP Temp Temp Source Heart Rate Resp SpO2 Height Weight   (!) 164/104 98.6 °F (37 °C) Oral 77 18 97 % -- --         Constitutional:  Alert, development consistent with age. Neck:  Normal ROM. Supple. Right Knee: anterior            Tenderness:  moderate. .              Swelling/Effusion: Mild. Deformity: no deformity observed/palpated. ROM: full range with pain. Skin:  no wounds or erythema. Drawer's:  Negative. Lachman's: Negative. Apley's: Unable to Assess. Justin's: Unable to Assess. Valgus/Varus Stress: Positive. Crepitus: Positive. Hip:            Tenderness:  none. Swelling: None. Deformity: no.              ROM: full range of motion. Skin:  no wounds, erythema, or swelling. Joint(s) Above/Below: ankle. Tenderness:  none. Swelling: No.              Deformity: no deformity observed/palpated. ROM: full range of motion. Skin:  no wounds, erythema, or swelling. Neurovascular: Motor deficit: none. Sensory deficit: none. Pulse deficit: none. Capillary refill: normal.  Gait:  limp due to affected limb. Lymphatics: No lymphangitis or adenopathy noted. Neurological:  Oriented. Motor functions intact. Lab / Imaging Results   (All laboratory and radiology results have been personally reviewed by myself)  Labs:  No results found for this visit on 06/23/22. Imaging: All Radiology results interpreted by Radiologist unless otherwise noted. XR KNEE RIGHT (3 VIEWS)   Final Result   No acute abnormality of the knee. Mild joint effusion.            ED Course / Medical Decision Making   Medications - No data to display     Consult(s):   None    Procedure(s):   none. MDM:     Imaging was obtained based on moderate suspicion for fracture / bony abnormality, joint effusion as per history/physical findings. X-ray imaging demonstrated no acute abnormality of the knee with a mild joint effusion. At this time, patient is without objective evidence or require abdomen or further evaluation in the ER setting today. Patient was placed in Ace wrap and recommended to initiate RICE as well as to follow-up with orthopedics for further evaluation in the following week should she fail to see improvement. Patient is alert and oriented, no acute distress and afebrile. Patient recommended to return to ER with new or worsening symptoms. Patient states she is both understandable and agreeable to this plan. Plan of Care/Counseling:  NISA Pfeiffer reviewed today's visit with the patient in addition to providing specific details for the plan of care and counseling regarding the diagnosis and prognosis. Questions are answered at this time and are agreeable with the plan. Assessment      1. Acute pain of right knee      Plan   Discharged home. Patient condition is good    New Medications     New Prescriptions    No medications on file     Electronically signed by NISA Pfeiffer   DD: 6/23/22  **This report was transcribed using voice recognition software. Every effort was made to ensure accuracy; however, inadvertent computerized transcription errors may be present.   END OF ED PROVIDER NOTE       Linda Pfeiffer  06/23/22 2642

## 2022-06-24 LAB
ALBUMIN CSF: 14 MG/DL (ref 0–35)
ALBUMIN INDEX: 3.9 RATIO (ref 0–9)
ALBUMIN, SERUM BY NEPHELOMETRY: 3584 MG/DL (ref 3500–5200)
IGG CSF: 2.6 MG/DL (ref 0–6)
IGG INDEX: 0.57 RATIO (ref 0.28–0.66)
IGG SYNTHESIS RATE CSF: <0 MG/D
IGG/ALBUMIN CSF: 0.19 RATIO (ref 0.09–0.25)
IGG: 1167 MG/DL (ref 768–1632)
MULTIPLE SCLEROSIS PANEL: NORMAL
OLIGOCLONAL BANDS CSF: NEGATIVE
OLIGOCLONAL BANDS NUMBER: 0 BANDS (ref 0–1)

## 2022-06-25 LAB
CSF CULTURE: NORMAL
GRAM STAIN RESULT: NORMAL

## 2022-07-01 ENCOUNTER — OFFICE VISIT (OUTPATIENT)
Dept: ORTHOPEDIC SURGERY | Age: 32
End: 2022-07-01
Payer: COMMERCIAL

## 2022-07-01 VITALS — WEIGHT: 243 LBS | BODY MASS INDEX: 43.05 KG/M2 | HEIGHT: 63 IN

## 2022-07-01 DIAGNOSIS — M67.51 PLICA OF KNEE, RIGHT: Primary | ICD-10-CM

## 2022-07-01 PROCEDURE — 99203 OFFICE O/P NEW LOW 30 MIN: CPT | Performed by: ORTHOPAEDIC SURGERY

## 2022-07-01 PROCEDURE — 1036F TOBACCO NON-USER: CPT | Performed by: ORTHOPAEDIC SURGERY

## 2022-07-01 PROCEDURE — G8417 CALC BMI ABV UP PARAM F/U: HCPCS | Performed by: ORTHOPAEDIC SURGERY

## 2022-07-01 PROCEDURE — G8427 DOCREV CUR MEDS BY ELIG CLIN: HCPCS | Performed by: ORTHOPAEDIC SURGERY

## 2022-07-01 RX ORDER — CEPHALEXIN 500 MG/1
CAPSULE ORAL
COMMUNITY
Start: 2022-06-28 | End: 2022-07-12

## 2022-07-01 NOTE — PROGRESS NOTES
Milton Todd is a 27 y.o. female, who presents   Chief Complaint   Patient presents with    Knee Pain     Right knee pain starting around 6/20/2022. No specific injury. ER gave her ace wrap. HPI[de-identified] Right knee pain snapping is been occurring recently. She was told at one point that she had an effusion in the knee. She had gone to emergency centers for other issues and had her knee evaluated on a subsequent visit. She was told she had effusion and was recommended for orthopedic follow-up. She has discomfort around the knee. 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 Allergies; medications; past medical, surgical, family, and social history; and problem list have been reviewed today and updated as indicated in this encounter - see below following Ortho specifics. Musculoskeletal: Ankle leg stance possible on each side without assistance. Gait is smooth and balanced at a slow to moderate pace. Range of motion of hips is good with no pain or instability. Both knees move well with good stability and collaterals and cruciate ligaments. She has a little bit of laxity which is felt to be physiologic for Stephanie. She has no signs of meniscal pathology at this time. There is consistent habitus along the lateral border of the patella which suggests a plica or synovial impingement. Radiologic Studies: Imaging of the right knee 6/23/2022 and 3 views nonweightbearing shows slight lateral tilt of the patella without no other gross abnormalities. ASSESSMENT:  Stephanie was seen today for knee pain. Diagnoses and all orders for this visit:    Plica of knee, right  -     External Referral To Physical Therapy     Treatment alternatives were reviewed including medical and physical therapies, injections, and surgical options, expected risks benefits and likely outcome of each were discussed in detail, questions asked and answered and understood.   Discussed symptoms well physical findings and imaging results. 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 PLAN: Recommendation is for physical therapy to help improve stability and realign the extensor mechanism. We will schedule her as an outpatient follow-up and 4 weeks. Patient Active Problem List   Diagnosis    Depression    History of ADHD    Viral URI with cough    Fatigue    Elevated blood pressure reading    Morbid obesity ([address]    Sprained ankle    Osteoarthritis of multiple joints    Muscle spasm    Hidradenitis suppurativa    Thrombocytosis    Hidradenitis       Past Medical History:   Diagnosis Date    ADHD     Depression     Hidradenitis     Hyperlipidemia     Sciatica of left side        Past Surgical History:   Procedure Laterality Date    OTHER SURGICAL HISTORY      EXCISION OF LEFT AXILLARY HIDRADENITIS    TONSILLECTOMY  2008    WISDOM TOOTH EXTRACTION         Current Outpatient Medications   Medication Sig Dispense Refill    cephALEXin (KEFLEX) 500 MG capsule        No current facility-administered medications for this visit.        No Known Allergies    Social History     Socioeconomic History    Marital status: Single     Spouse name: None    Number of children: None    Years of education: None    Highest education level: None   Occupational History     Employer: NOT EMPLOYED   Tobacco Use    Smoking status: Former Smoker     Types: Cigarettes     Quit date: 2015     Years since quittin.6    Smokeless tobacco: Never Used    Tobacco comment: occasional; quit smoking in    Vaping Use    Vaping Use: Never used   Substance and Sexual Activity    Alcohol use: Yes     Comment: Social    Drug use: Yes     Types: Marijuana Ilene Postin)     Comment: medical--- 2-3 times per day    Sexual activity: Yes     Partners: Male   Other Topics Concern    None   Social History Narrative    None     Social Determinants of Health     Financial Resource Strain:     Difficulty of Paying Living Expenses: Not on file   Food Insecurity:     Worried About Running Out of Food in the Last Year: Not on file    Irma of Food in the Last Year: Not on file   Transportation Needs:     Lack of Transportation (Medical): Not on file    Lack of Transportation (Non-Medical): Not on file   Physical Activity:     Days of Exercise per Week: Not on file    Minutes of Exercise per Session: Not on file   Stress:     Feeling of Stress : Not on file   Social Connections:     Frequency of Communication with Friends and Family: Not on file    Frequency of Social Gatherings with Friends and Family: Not on file    Attends Denominational Services: Not on file    Active Member of [address] [place] [address] [employer] or Organizations: Not on file    Attends Club or Organization Meetings: Not on file    Marital Status: Not on file   Intimate Partner Violence:     Fear of Current or Ex-Partner: Not on file    Emotionally Abused: Not on file    Physically Abused: Not on file    Sexually Abused: Not on file   Housing Stability:     Unable to Pay for Housing in the Last Year: Not on file    Number of [place] in the Last Year: Not on file    Unstable Housing in the Last Year: Not on file       Family History   Problem Relation Age of Onset    High Blood Pressure Mother     Osteoarthritis Mother     Kidney Disease Father     Heart Attack Maternal Grandmother     No Known Problems Maternal Grandfather     Heart Attack Paternal Grandmother     Brain Cancer Paternal Grandfather     Cancer Paternal Uncle          Review of Systems:   As follows except as previously noted in HPI:  Constitutional: Negative for chills, diaphoresis,  fever   Respiratory: Negative for cough, shortness of breath and wheezing. Cardiovascular: Negative for chest pain and palpitations.    Neurological: Negative for dizziness, syncope,   GI / : abdominal pain or cramping  Musculoskeletal: see HPI       Objective:   Physical Exam   Constitutional: Oriented to person, place, and time. and appears well-developed and well-nourished. :   Head: Normocephalic and atraumatic. Neck: Neck supple. Eyes: EOM are normal.   Pulmonary/Chest: Effort normal.  No respiratory distress, no wheezes. Neurological: Alert and oriented to person  Skin: Skin is warm and dry. Chapodulce Dolan DO    7/1/22  8:47 AM    All reasonable efforts have been made to minimize the risk of errors that may occur in the use of voice recognition and other electronic means of charting.

## 2022-07-08 RX ORDER — ACETAZOLAMIDE 250 MG/1
250 TABLET ORAL 2 TIMES DAILY
Qty: 90 TABLET | Refills: 3 | Status: SHIPPED | OUTPATIENT
Start: 2022-07-08 | End: 2022-07-22 | Stop reason: SDUPTHER

## 2022-07-12 ENCOUNTER — OFFICE VISIT (OUTPATIENT)
Dept: FAMILY MEDICINE CLINIC | Age: 32
End: 2022-07-12
Payer: COMMERCIAL

## 2022-07-12 VITALS
OXYGEN SATURATION: 98 % | WEIGHT: 232.8 LBS | BODY MASS INDEX: 41.25 KG/M2 | DIASTOLIC BLOOD PRESSURE: 88 MMHG | SYSTOLIC BLOOD PRESSURE: 138 MMHG | HEART RATE: 72 BPM | TEMPERATURE: 99.3 F | HEIGHT: 63 IN

## 2022-07-12 DIAGNOSIS — G89.29 CHRONIC PAIN OF RIGHT KNEE: ICD-10-CM

## 2022-07-12 DIAGNOSIS — R25.2 MUSCLE CRAMPS: ICD-10-CM

## 2022-07-12 DIAGNOSIS — E66.01 MORBID OBESITY (HCC): ICD-10-CM

## 2022-07-12 DIAGNOSIS — Z11.4 ENCOUNTER FOR SCREENING FOR HIV: ICD-10-CM

## 2022-07-12 DIAGNOSIS — R03.0 ELEVATED BLOOD PRESSURE READING: ICD-10-CM

## 2022-07-12 DIAGNOSIS — M25.561 CHRONIC PAIN OF RIGHT KNEE: ICD-10-CM

## 2022-07-12 DIAGNOSIS — E66.01 MORBID OBESITY (HCC): Primary | ICD-10-CM

## 2022-07-12 DIAGNOSIS — Z11.59 ENCOUNTER FOR HEPATITIS C SCREENING TEST FOR LOW RISK PATIENT: ICD-10-CM

## 2022-07-12 DIAGNOSIS — R59.0 ADENOPATHY, CERVICAL: ICD-10-CM

## 2022-07-12 DIAGNOSIS — G93.2 IIH (IDIOPATHIC INTRACRANIAL HYPERTENSION): ICD-10-CM

## 2022-07-12 DIAGNOSIS — M54.32 LEFT SIDED SCIATICA: ICD-10-CM

## 2022-07-12 DIAGNOSIS — M25.552 LEFT HIP PAIN: ICD-10-CM

## 2022-07-12 LAB
BASOPHILS ABSOLUTE: 0.05 E9/L (ref 0–0.2)
BASOPHILS RELATIVE PERCENT: 0.4 % (ref 0–2)
EOSINOPHILS ABSOLUTE: 1.61 E9/L (ref 0.05–0.5)
EOSINOPHILS RELATIVE PERCENT: 13.7 % (ref 0–6)
HBA1C MFR BLD: 5.8 % (ref 4–5.6)
HCT VFR BLD CALC: 44.2 % (ref 34–48)
HEMOGLOBIN: 13.9 G/DL (ref 11.5–15.5)
IMMATURE GRANULOCYTES #: 0.03 E9/L
IMMATURE GRANULOCYTES %: 0.3 % (ref 0–5)
LYMPHOCYTES ABSOLUTE: 2.78 E9/L (ref 1.5–4)
LYMPHOCYTES RELATIVE PERCENT: 23.6 % (ref 20–42)
MCH RBC QN AUTO: 30.3 PG (ref 26–35)
MCHC RBC AUTO-ENTMCNC: 31.4 % (ref 32–34.5)
MCV RBC AUTO: 96.5 FL (ref 80–99.9)
MONOCYTES ABSOLUTE: 0.5 E9/L (ref 0.1–0.95)
MONOCYTES RELATIVE PERCENT: 4.2 % (ref 2–12)
NEUTROPHILS ABSOLUTE: 6.8 E9/L (ref 1.8–7.3)
NEUTROPHILS RELATIVE PERCENT: 57.8 % (ref 43–80)
PDW BLD-RTO: 13.9 FL (ref 11.5–15)
PLATELET # BLD: 461 E9/L (ref 130–450)
PMV BLD AUTO: 10.2 FL (ref 7–12)
RBC # BLD: 4.58 E12/L (ref 3.5–5.5)
WBC # BLD: 11.8 E9/L (ref 4.5–11.5)

## 2022-07-12 PROCEDURE — 36415 COLL VENOUS BLD VENIPUNCTURE: CPT | Performed by: FAMILY MEDICINE

## 2022-07-12 PROCEDURE — G8427 DOCREV CUR MEDS BY ELIG CLIN: HCPCS | Performed by: FAMILY MEDICINE

## 2022-07-12 PROCEDURE — 1036F TOBACCO NON-USER: CPT | Performed by: FAMILY MEDICINE

## 2022-07-12 PROCEDURE — G8417 CALC BMI ABV UP PARAM F/U: HCPCS | Performed by: FAMILY MEDICINE

## 2022-07-12 PROCEDURE — 99204 OFFICE O/P NEW MOD 45 MIN: CPT | Performed by: FAMILY MEDICINE

## 2022-07-12 SDOH — ECONOMIC STABILITY: FOOD INSECURITY: WITHIN THE PAST 12 MONTHS, THE FOOD YOU BOUGHT JUST DIDN'T LAST AND YOU DIDN'T HAVE MONEY TO GET MORE.: NEVER TRUE

## 2022-07-12 SDOH — ECONOMIC STABILITY: FOOD INSECURITY: WITHIN THE PAST 12 MONTHS, YOU WORRIED THAT YOUR FOOD WOULD RUN OUT BEFORE YOU GOT MONEY TO BUY MORE.: NEVER TRUE

## 2022-07-12 ASSESSMENT — ENCOUNTER SYMPTOMS
EYE PAIN: 0
SORE THROAT: 0
VOMITING: 0
WHEEZING: 0
RHINORRHEA: 0
EYE DISCHARGE: 0
SINUS PRESSURE: 0
CONSTIPATION: 0
DIARRHEA: 0
NAUSEA: 0
ABDOMINAL PAIN: 0
CHEST TIGHTNESS: 0
COLOR CHANGE: 0
ABDOMINAL DISTENTION: 0
COUGH: 0
SHORTNESS OF BREATH: 0

## 2022-07-12 ASSESSMENT — PATIENT HEALTH QUESTIONNAIRE - PHQ9
SUM OF ALL RESPONSES TO PHQ QUESTIONS 1-9: 8
10. IF YOU CHECKED OFF ANY PROBLEMS, HOW DIFFICULT HAVE THESE PROBLEMS MADE IT FOR YOU TO DO YOUR WORK, TAKE CARE OF THINGS AT HOME, OR GET ALONG WITH OTHER PEOPLE: 0
SUM OF ALL RESPONSES TO PHQ QUESTIONS 1-9: 8
4. FEELING TIRED OR HAVING LITTLE ENERGY: 2
8. MOVING OR SPEAKING SO SLOWLY THAT OTHER PEOPLE COULD HAVE NOTICED. OR THE OPPOSITE, BEING SO FIGETY OR RESTLESS THAT YOU HAVE BEEN MOVING AROUND A LOT MORE THAN USUAL: 3
6. FEELING BAD ABOUT YOURSELF - OR THAT YOU ARE A FAILURE OR HAVE LET YOURSELF OR YOUR FAMILY DOWN: 0
SUM OF ALL RESPONSES TO PHQ9 QUESTIONS 1 & 2: 0
1. LITTLE INTEREST OR PLEASURE IN DOING THINGS: 0
5. POOR APPETITE OR OVEREATING: 0
SUM OF ALL RESPONSES TO PHQ QUESTIONS 1-9: 8
2. FEELING DOWN, DEPRESSED OR HOPELESS: 0
SUM OF ALL RESPONSES TO PHQ QUESTIONS 1-9: 8
3. TROUBLE FALLING OR STAYING ASLEEP: 0
7. TROUBLE CONCENTRATING ON THINGS, SUCH AS READING THE NEWSPAPER OR WATCHING TELEVISION: 3
9. THOUGHTS THAT YOU WOULD BE BETTER OFF DEAD, OR OF HURTING YOURSELF: 0

## 2022-07-12 ASSESSMENT — SOCIAL DETERMINANTS OF HEALTH (SDOH): HOW HARD IS IT FOR YOU TO PAY FOR THE VERY BASICS LIKE FOOD, HOUSING, MEDICAL CARE, AND HEATING?: NOT HARD AT ALL

## 2022-07-12 NOTE — PROGRESS NOTES
[facility])  Family Medicine Outpatient        SUBJECTIVE:  CC: had concerns including Established New Doctor (ed f/u rt knee pain). HPI:Leanne Luna presented to the clinic for a new patient visit. She reports having several altering arthralgia pains. She was recently in the ED for acute knee pain. Knee xray showed no acute abnormality, but an infusion. She also reports left hip pain and sciatica. She denies any acute trauma. She reports her left hip has been locking since 2019 and her sciatica since 2017. She works at [employer] in [place]. Review of Systems   Constitutional:  Negative for activity change, appetite change, fatigue and fever. HENT:  Negative for congestion, postnasal drip, rhinorrhea, sinus pressure, sneezing and sore throat. Eyes:  Negative for pain and discharge. Respiratory:  Negative for cough, chest tightness, shortness of breath and wheezing. Cardiovascular:  Negative for chest pain, palpitations and leg swelling. Gastrointestinal:  Negative for abdominal distention, abdominal pain, constipation, diarrhea, nausea and vomiting. Endocrine: Negative for cold intolerance and heat intolerance. Genitourinary:  Negative for decreased urine volume, frequency and urgency. Musculoskeletal:  Positive for arthralgias and back pain. Negative for myalgias. Skin:  Negative for color change and rash. Allergic/Immunologic: Negative for food allergies and immunocompromised state. Neurological:  Positive for numbness. Negative for dizziness, syncope, weakness and headaches. Psychiatric/Behavioral:  Negative for agitation and behavioral problems.       Outpatient Medications Marked as Taking for the 7/12/22 encounter (Office Visit) with Priyanka Jiménez MD   Medication Sig Dispense Refill    [DISCONTINUED] acetaZOLAMIDE (DIAMOX) 250 MG tablet Take 1 tablet by mouth 2 times daily 90 tablet 3       Past Medical History:   Diagnosis Date    ADHD Depression     Hidradenitis     Hyperlipidemia     Sciatica of left side        Past Surgical History:   Procedure Laterality Date    OTHER SURGICAL HISTORY      EXCISION OF LEFT AXILLARY HIDRADENITIS    TONSILLECTOMY  2008    WISDOM TOOTH EXTRACTION         Family History   Problem Relation Age of Onset    High Blood Pressure Mother     Osteoarthritis Mother     Kidney Disease Father     Heart Attack Maternal Grandmother     No Known Problems Maternal Grandfather     Heart Attack Paternal Grandmother     Brain Cancer Paternal Grandfather     Cancer Paternal Uncle        No Known Allergies    Social History:  TOBACCO:   reports that she quit smoking about 6 years ago. Her smoking use included cigarettes. She has never used smokeless tobacco.  ETOH:   reports current alcohol use. OBJECTIVE    VS: /88   Pulse 72   Temp 99.3 °F (37.4 °C) (Temporal)   Ht 5' 3\" (1.6 m)   Wt 232 lb 12.8 oz (105.6 kg)   SpO2 98%   BMI 41.24 kg/m²   Physical Exam  Constitutional:       General: She is not in acute distress. Appearance: She is well-developed. She is obese. She is not diaphoretic. HENT:      Head: Normocephalic and atraumatic. Eyes:      Conjunctiva/sclera: Conjunctivae normal.      Pupils: Pupils are equal, round, and reactive to light. Cardiovascular:      Rate and Rhythm: Normal rate and regular rhythm. Pulmonary:      Effort: Pulmonary effort is normal.      Breath sounds: Normal breath sounds. Abdominal:      General: Bowel sounds are normal. There is no distension. Palpations: Abdomen is soft. Tenderness: There is no abdominal tenderness. Hernia: No hernia is present. Musculoskeletal:         General: Tenderness present. Cervical back: Normal range of motion and neck supple. Lymphadenopathy:      Cervical: Cervical adenopathy present. Skin:     General: Skin is warm and dry. Neurological:      Mental Status: She is alert and oriented to person, place, and time. ASSESSMENT/PLAN:  1. Morbid obesity ([facility] [address]  - CBC with Auto Differential; Future  - Comprehensive Metabolic Panel; Future  - Hemoglobin A1C; Future  - Lipid Panel; Future  - TSH; Future  - Vitamin D 25 Hydroxy; Future  - Basic Metabolic Panel; Future    2. Muscle cramps  - Comprehensive Metabolic Panel; Future  - Magnesium; Future  - CK; Future  - MONONUCLEOSIS SCREEN; Future  - Sedimentation Rate; Future  - C-Reactive Protein; Future  - JUVENCIO; Future  - Miscellaneous Sendout; Future  - RHEUMATOID FACTOR; Future  - ANTI-RNP ([facility]); Future    3. IIH (idiopathic intracranial hypertension)  Following with Neurology and is on Diamox. Neurology appointment 7/22.    4. Elevated blood pressure reading  Encourage patient on weight reduction and low sodium diet. 5. Adenopathy, cervical  Recently treated at [facility] with antibiotics. Presumed residual, but baseline labs today and mono screen. If persist patient to let me know. - MONONUCLEOSIS SCREEN; Future    6. Left hip pain  - XR HIP LEFT (2-3 VIEWS); Future    7. Left sided sciatica  Conservative management at this time. 8. Encounter for hepatitis C screening test for low risk patient  - Hepatitis C Antibody; Future    9. Encounter for screening for HIV  - HIV Screen; Future    10. Right knee pain  Recent xray with no acute abnormality with an effusion. Conservative treatment at this time. Patient starts with PT at [facility] tomorrow. Consult with Ortho 7/29. Dr. Olga Lidia Mendez previous pcp. Sign record release    I have reviewed my findings and recommendations with Carisa Muller MD  7/25/2022 9:08 AM    Counseled regarding above diagnosis, including possible risks and complications, especially if left uncontrolled. Discussed medications risk/benefits and possible side effects and alternatives to treatment.  Patient and/or guardian verbalizes understanding, agrees, feels comfortable with and wishes to proceed with above treatment plan. Advised patient regarding importance of keeping up with recommended health maintenance and to schedule as soon as possible if overdue, as this is important in assessing for undiagnosed pathology, especially cancer, as well as protecting against potentially harmful/life threatening disease. Patient and/or guardian verbalizes understanding and agrees with above counseling, assessment and plan. All questions answered. Please note this report has been partially produced using speech recognition software  and may contain errors related to that system including grammar, punctuation and spelling as well as words and phrases that may seem inappropriate. If there are questions or concerns please feel free to contact me to clarify.

## 2022-07-13 LAB
ALBUMIN SERPL-MCNC: 4.4 G/DL (ref 3.5–5.2)
ALP BLD-CCNC: 86 U/L (ref 35–104)
ALT SERPL-CCNC: 15 U/L (ref 0–32)
ANION GAP SERPL CALCULATED.3IONS-SCNC: 18 MMOL/L (ref 7–16)
ANTI-NUCLEAR ANTIBODY (ANA): NEGATIVE
AST SERPL-CCNC: 26 U/L (ref 0–31)
BILIRUB SERPL-MCNC: 0.5 MG/DL (ref 0–1.2)
BUN BLDV-MCNC: 14 MG/DL (ref 6–20)
C-REACTIVE PROTEIN: 1 MG/DL (ref 0–0.4)
CALCIUM SERPL-MCNC: 9.3 MG/DL (ref 8.6–10.2)
CHLORIDE BLD-SCNC: 105 MMOL/L (ref 98–107)
CHOLESTEROL, TOTAL: 224 MG/DL (ref 0–199)
CO2: 12 MMOL/L (ref 22–29)
CREAT SERPL-MCNC: 0.7 MG/DL (ref 0.5–1)
GFR AFRICAN AMERICAN: >60
GFR NON-AFRICAN AMERICAN: >60 ML/MIN/1.73
GLUCOSE BLD-MCNC: 70 MG/DL (ref 74–99)
HDLC SERPL-MCNC: 39 MG/DL
HEPATITIS C ANTIBODY INTERPRETATION: NORMAL
HIV-1 AND HIV-2 ANTIBODIES: NORMAL
LDL CHOLESTEROL CALCULATED: 164 MG/DL (ref 0–99)
MAGNESIUM: 2.4 MG/DL (ref 1.6–2.6)
MONO TEST: NEGATIVE
POTASSIUM SERPL-SCNC: 4.3 MMOL/L (ref 3.5–5)
RHEUMATOID FACTOR: <10 IU/ML (ref 0–13)
SEDIMENTATION RATE, ERYTHROCYTE: 22 MM/HR (ref 0–20)
SODIUM BLD-SCNC: 135 MMOL/L (ref 132–146)
TOTAL CK: 76 U/L (ref 20–180)
TOTAL PROTEIN: 8.2 G/DL (ref 6.4–8.3)
TRIGL SERPL-MCNC: 103 MG/DL (ref 0–149)
TSH SERPL DL<=0.05 MIU/L-ACNC: 1.22 UIU/ML (ref 0.27–4.2)
VITAMIN D 25-HYDROXY: 11 NG/ML (ref 30–100)
VLDLC SERPL CALC-MCNC: 21 MG/DL

## 2022-07-14 LAB — ENA TO RNP ANTIBODY: NEGATIVE

## 2022-07-22 ENCOUNTER — OFFICE VISIT (OUTPATIENT)
Dept: NEUROLOGY | Age: 32
End: 2022-07-22
Payer: COMMERCIAL

## 2022-07-22 VITALS
TEMPERATURE: 98.1 F | OXYGEN SATURATION: 100 % | HEART RATE: 78 BPM | SYSTOLIC BLOOD PRESSURE: 127 MMHG | DIASTOLIC BLOOD PRESSURE: 82 MMHG

## 2022-07-22 DIAGNOSIS — G93.2 INTRACRANIAL HYPERTENSION: Primary | ICD-10-CM

## 2022-07-22 LAB
Lab: NORMAL
REPORT: NORMAL
THIS TEST SENT TO: NORMAL

## 2022-07-22 PROCEDURE — 99213 OFFICE O/P EST LOW 20 MIN: CPT | Performed by: NURSE PRACTITIONER

## 2022-07-22 PROCEDURE — G8417 CALC BMI ABV UP PARAM F/U: HCPCS | Performed by: NURSE PRACTITIONER

## 2022-07-22 PROCEDURE — G8427 DOCREV CUR MEDS BY ELIG CLIN: HCPCS | Performed by: NURSE PRACTITIONER

## 2022-07-22 PROCEDURE — 1036F TOBACCO NON-USER: CPT | Performed by: NURSE PRACTITIONER

## 2022-07-22 RX ORDER — ACETAZOLAMIDE 250 MG/1
250 TABLET ORAL 2 TIMES DAILY
Qty: 90 TABLET | Refills: 3 | Status: SHIPPED | OUTPATIENT
Start: 2022-07-22

## 2022-07-22 NOTE — PROGRESS NOTES
[address] [facility]. Marian Beltran M.D., F.A.C.P. [facility], DNP, APRN, CNS  Daved Led. Raffi Dickinson, MSN, APRN-FNP-C  Neris Talavera MSN, APRN, FNP-C  Dmitry RUEDA PA-C  [address] MSN, APRN, FNP-C  [address] [address]  [place], [address]  Phone: [phone]  Fax: [phone]       Quinn Nina is a 27 y.o. right handed female     Presents to neurology clinic today for evaluation management of abnormal MRI brain    Patient is a decent historian and provides her history. Patient went to a routine eye exam in a local [facility] optometry center in February or March. 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 Patient began to notice that she had difficulty focusing even with new prescription glasses prior to that exam in March. Patient's peripheral vision to the left is severely impaired. Patient also has blurred vision to the left eye. Close up patient is able to see words however further back words on the page or posterior look like foreign language and that letters are combined. Patient's right eye continues to have perfect vision. Patient follow back up with her [facility] in April and had more testing completed about 1 to 2 weeks after that. Patient states her vision has not changed at all over this time period. Patient says nothing makes this better or worse. Patient had MRI of the orbits completed in April which suggest possible idiopathic intracranial hypertension. Patient was sent for MRI of the brain few days later on April 9 which was consistent with idiopathic intracranial hypertension. Patient does not have a primary care and it is taken this long to get into see neurology. Patient denies headache, speech or swallowing difficulty, focal weakness, numbness or tingling of extremities.     Today patient reports that since her lumbar puncture and starting Diamox she feels much better. Meningitis/encephalitis, oligoclonal banding, JUVENCIO, rheumatoid factor, anti-RNP, HIV and monotest all negative/nonreactive. Patient has lost over 10 pounds since her last visit here. Patient is no longer having headaches or dizziness. Patient's eyesight has also improved. She has not followed back up with the ophthalmologist since her last visit here. Patient sleeps 4 to 7 hours each night although sleep is very broken. Patient eats 2-3 meals a day. Patient drinks tea 3-4 times per week. Also drinks lemonade frequently. Patient drinks 1 to 1-1/2 L of water per day. Patient reports a mild stress level. Patient does not exercise. She reports that she quit smoking about 6 years ago. Her smoking use included cigarettes. She has never used smokeless tobacco. She reports current alcohol use. She reports current drug use. Drug: Marijuana Bhakti Mustard). Patient is single and has no children. Patient works at [employer]. Of note patient's father passed away in 2019. Patient's 2 uncles passed away in 2016 back to back. Patient states she has no family here locally except her mother. Allergies:       Patient has no known allergies. Medications:     Prior to Admission medications    Medication Sig Start Date End Date Taking? Authorizing Provider   acetaZOLAMIDE (DIAMOX) 250 MG tablet Take 1 tablet by mouth in the morning and 1 tablet before bedtime. 7/22/22  Yes AMERICO Ayoub NP     Objective:       /82 (Site: Right Lower Arm)   Pulse 78   Temp 98.1 °F (36.7 °C)   SpO2 100%     General appearance: alert, appears stated age, cooperative and in no distress  Head: normocephalic, without obvious abnormality, atraumatic  Eyes: conjunctivae/corneas clear; no drainage  Neck: supple, symmetrical, trachea midline and thyroid not enlarged  Back: symmetric, no curvature.  ROM normal.   Lungs: clear to auscultation bilaterally, unlabored breaths on room air  Heart: regular rate and rhythm, S1, S2 normal, no murmur  Abdomen: soft, non-tender; bowel sounds normal  Extremities: normal, atraumatic, no cyanosis or edema  Pulses: 2+ and symmetric  Skin:  color, texture, turgor normal--no rashes or lesions      Mental Status: alert and oriented x 4, follows commands well, very conversant, easily distracted    Appropriate attention/concentration  Intact fundus of knowledge  Memories intact    Speech: no dysarthria  Language: no aphasias--- repetition, and object identification intact; reading complicated due to vision difficulty    Cranial Nerves:  I: smell  intact   II: visual acuity     II: visual fields Full finger counting bilaterally    Peripheral vision loss to left eye--improved.   Right visual fields intact    No red desaturation   II: pupils PERRL    No Asif's pupil   III,VII: ptosis None   III,IV,VI: extraocular muscles  EOMI without nystagmus   V: mastication Normal   V: facial light touch sensation  Normal   V,VII: corneal reflex     VII: facial muscle function - upper  Normal   VII: facial muscle function - lower Normal   VIII: hearing Normal   IX: soft palate elevation  Normal   IX,X: gag reflex    XI: trapezius strength  5/5   XI: sternocleidomastoid strength 5/5   XI: neck extension strength  5/5   XII: tongue strength  Normal     Motor:  Strong bilateral handgrips  5/5 throughout  Normal bulk and tone  No drift   No abnormal movements    Sensory:  LT and PP normal  Vibration normal    Coordination:   FN, FFM and JOON normal  HS normal    Gait:  Normal  Walks well on toes, heels, and tandem    DTR:   Right Brachioradialis reflex 1+  Left Brachioradialis reflex 1+  Right Biceps reflex 1+  Left Biceps reflex 1+  Right Triceps reflex 1+  Left Triceps reflex 1+  Right Quadriceps reflex 1+  Left Quadriceps reflex 1+  Right Achilles reflex 1+  Left Achilles reflex 1+    No Babinskis  No Otto's    No other pathological reflexes    Laboratory/Radiology:     CBC:   Lab Results   Component Value Date/Time    WBC 11.8 07/12/2022 12:00 PM    RBC 4.58 07/12/2022 12:00 PM    HGB 13.9 07/12/2022 12:00 PM    HCT 44.2 07/12/2022 12:00 PM    MCV 96.5 07/12/2022 12:00 PM    MCH 30.3 07/12/2022 12:00 PM    MCHC 31.4 07/12/2022 12:00 PM    RDW 13.9 07/12/2022 12:00 PM     07/12/2022 12:00 PM    MPV 10.2 07/12/2022 12:00 PM     Lab Results   Component Value Date     07/12/2022    K 4.3 07/12/2022     07/12/2022    CO2 12 (L) 07/12/2022    BUN 14 07/12/2022    CREATININE 0.7 07/12/2022    GLUCOSE 70 (L) 07/12/2022    CALCIUM 9.3 07/12/2022    PROT 8.2 07/12/2022    LABALBU 4.4 07/12/2022    BILITOT 0.5 07/12/2022    ALKPHOS 86 07/12/2022    AST 26 07/12/2022    ALT 15 07/12/2022    LABGLOM >60 07/12/2022    GFRAA >60 07/12/2022      Latest Reference Range & Units 6/21/22 16:00   RBC, CSF /uL <2000   WBC, CSF 0 - 2 /uL <3   Neutrophils, CSF 0 - 10 % 0   Monocytes, CSF 10 - 70 % 100 (H)   Color, CSF  Colorless   Tube Number + CELL CT + DIFF-CSF  Tube 4   (H): Data is abnormally high     Latest Reference Range & Units 6/21/22 16:00   Albumin Index 0.0 - 9.0 ratio 3.9   Albumin, CSF 0 - 35 mg/dL 14   Appearance, CSF Clear  Clear   CSF Culture  Growth not present   GLUCOSE,CSF 40 - 70 mg/dL 65   IgG index 0.28 - 0.66 ratio 0.57   IgG Synthesis Rate, CSF <=8.0 mg/d <0.0   IgG, CSF 0.0 - 6.0 mg/dL 2.6   IgG/Albumin CSF 0.09 - 0.25 ratio 0.19   OLIGOCLONAL BANDING  Rpt   Oligoclonal Bands Number 0 - 1 Bands 0   Oligoclonal Bands, CSF Negative  Negative   PROTEIN,CSF 15 - 40 mg/dL 25       Meningitis/encephalitis panel not detected  HIV nonreactive  Mono test negative  JUVENCIO negative  Rheumatoid factor <10  Anti-RNP negative    MRI brain with and without contrast 4/9/2022:  Prominent CSF within the optic nerve sheaths and mild flattening of the   superior margin of the pituitary gland which is nonspecific but can be seen   in the setting of idiopathic intracranial hypertension, in the appropriate   clinical setting. No acute intracranial process identified. MRI orbits face neck with and without contrast 4/4/2022  1. Findings suggestive of possible idiopathic intracranial hypertension   (flattening of the posterior sclera at the levels of the optic nerve disc   heads, mild prominence of the CSF surrounding the optic nerves and partially   empty sella). 2. No mass, signal abnormality or abnormal enhancement is seen in the orbits. All pertinent labs and images were personally reviewed at the time of this visit    Assessment:     Idiopathic intracranial hypertension   On initial visit, patient's vision with the left eye was blurred with loss of peripheral visual fields with difficulty with reading as words run together and look forward to her. Right eye vision is intact   Patient found to have flattening of the pituitary gland and posterior sclera at levels of optic nerve disc with mild prominence of CSF surrounding optic nerves on MRIs   Stat lumbar puncture through ED showed opening pressure of 30 closing pressure 23   Patient's vision has improved. Patient has no complaints of dizziness or headaches. Patient has also lost weight. Continue Diamox as ordered. Patient has intermittent paresthesias to fingertips and toes as her only side effects to Diamox thus far    Plan:     Continue Diamox 250 mg BID  Ophthalmology evaluation prior to next visit  Report to office in 3 month  Call with any issues    AMERICO Carmen - NP-C  2:31 PM  7/22/2022    I spent 28 minutes with this patient obtaining the HPI and discussing the exam with greater than 50% of the time providing counseling and education on medications and other treatment plans. All questions were answered prior to leaving my office.

## 2022-07-25 ASSESSMENT — ENCOUNTER SYMPTOMS: BACK PAIN: 1

## 2022-07-29 RX ORDER — METHYLPREDNISOLONE 4 MG/1
TABLET ORAL
Qty: 1 KIT | Refills: 0 | Status: SHIPPED | OUTPATIENT
Start: 2022-07-29 | End: 2022-08-04

## 2022-07-29 RX ORDER — ERGOCALCIFEROL 1.25 MG/1
50000 CAPSULE ORAL WEEKLY
Qty: 12 CAPSULE | Refills: 0 | Status: SHIPPED
Start: 2022-07-29 | End: 2022-10-09

## 2022-08-01 NOTE — PROGRESS NOTES
This MA spoke with pt - pt advised of results and recommendations per Dr. Dakota Steward. Pt verbalized that she dose not have any kind of brace or support for her knee and asked for something to be ordered. Please Advise.     Electronically signed by Tawanna Batista MA on 8/1/22 at 3:26 PM EDT

## 2022-08-05 ENCOUNTER — OFFICE VISIT (OUTPATIENT)
Dept: ORTHOPEDIC SURGERY | Age: 32
End: 2022-08-05
Payer: COMMERCIAL

## 2022-08-05 VITALS — HEIGHT: 63 IN | WEIGHT: 232 LBS | TEMPERATURE: 98 F | BODY MASS INDEX: 41.11 KG/M2

## 2022-08-05 DIAGNOSIS — M67.51 PLICA OF KNEE, RIGHT: Primary | ICD-10-CM

## 2022-08-05 PROCEDURE — G8417 CALC BMI ABV UP PARAM F/U: HCPCS | Performed by: ORTHOPAEDIC SURGERY

## 2022-08-05 PROCEDURE — G8427 DOCREV CUR MEDS BY ELIG CLIN: HCPCS | Performed by: ORTHOPAEDIC SURGERY

## 2022-08-05 PROCEDURE — 99213 OFFICE O/P EST LOW 20 MIN: CPT | Performed by: ORTHOPAEDIC SURGERY

## 2022-08-05 PROCEDURE — 1036F TOBACCO NON-USER: CPT | Performed by: ORTHOPAEDIC SURGERY

## 2022-08-05 NOTE — PROGRESS NOTES
Chief Complaint:   Chief Complaint   Patient presents with    Knee Pain     Right knee pain f/u has some tenderness and has been bending down a lot. Cheryle Savant for right knee problems. She had gone for physical therapy evaluation. Apparently they were backed up and could not start her formal treatments for some time but did supply her with home exercises which she has been doing diligently. He has noticed improvement in her knee and is quite pleased with that already. She is scheduled to go for formal treatments which should last several weeks. Allergies; medications; past medical, surgical, family, and social history; and problem list have been reviewed today and updated as indicated in this encounter seen below. Exam: Range of motion of the right knee is good without hesitation. There is minimal crepitus. There is minimal effusion. Her gait is smooth and balanced at a moderate pace. Radiographs: None    ASSESSMENT:    Raghu Mejía was seen today for knee pain. Diagnoses and all orders for this visit:    Plica of knee, right        PLAN: Attend a formal physical therapy sessions and continue home exercises. We will follow on an as-needed basis. Return if symptoms worsen or fail to improve. Current Outpatient Medications   Medication Sig Dispense Refill    vitamin D (ERGOCALCIFEROL) 1.25 MG (36727 UT) CAPS capsule Take 1 capsule by mouth once a week 12 capsule 0    acetaZOLAMIDE (DIAMOX) 250 MG tablet Take 1 tablet by mouth in the morning and 1 tablet before bedtime. 90 tablet 3     No current facility-administered medications for this visit.        Patient Active Problem List   Diagnosis    Depression    History of ADHD    Viral URI with cough    Fatigue    Elevated blood pressure reading    Morbid obesity (HCC)    Sprained ankle    Osteoarthritis of multiple joints    Muscle spasm    Hidradenitis suppurativa    Thrombocytosis    Hidradenitis       Past Medical History: Diagnosis Date    ADHD     Depression     Hidradenitis     Hyperlipidemia     Sciatica of left side        Past Surgical History:   Procedure Laterality Date    OTHER SURGICAL HISTORY      EXCISION OF LEFT AXILLARY HIDRADENITIS    TONSILLECTOMY  2008    WISDOM TOOTH EXTRACTION         No Known Allergies    Social History     Socioeconomic History    Marital status: Single     Spouse name: None    Number of children: None    Years of education: None    Highest education level: None   Occupational History     Employer: NOT EMPLOYED   Tobacco Use    Smoking status: Former     Types: Cigarettes     Quit date: 2015     Years since quittin.7    Smokeless tobacco: Never    Tobacco comments:     occasional; quit smoking in    Vaping Use    Vaping Use: Never used   Substance and Sexual Activity    Alcohol use: Yes     Comment: Social    Drug use: Yes     Types: Marijuana Tomás Chamberlain)     Comment: medical--- 2-3 times per day    Sexual activity: Yes     Partners: Male     Social Determinants of Health     Financial Resource Strain: Low Risk     Difficulty of Paying Living Expenses: Not hard at all   Food Insecurity: No Food Insecurity    Worried About [email or web address] in the Last Year: Never true    [facility] in the Last Year: Never true       Review of Systems  As follows except as previously noted in HPI:  Constitutional: Negative for chills, diaphoresis, fatigue, fever and unexpected weight change. Respiratory: Negative for cough, shortness of breath and wheezing. Cardiovascular: Negative for chest pain and palpitations. Neurological: Negative for dizziness, syncope, cephalgia. GI / : negative  Musculoskeletal: see HPI       Objective:   Physical Exam   Constitutional: Oriented to person, place, and time. and appears well-developed and well-nourished. :   Head: Normocephalic and atraumatic. Eyes: EOM are normal.   Neck: Neck supple. Cardiovascular: Normal rate and regular rhythm. Pulmonary/Chest: Effort normal. No stridor. No respiratory distress, no wheezes. Abdominal:  No abnormal distension. Neurological: Alert and oriented to person, place, and time. Skin: Skin is warm and dry. Psychiatric: Normal mood and affect.  Behavior is normal. Thought content normal.    CHERYL Carter DO    8/5/22  9:08 AM

## 2022-08-10 ENCOUNTER — TELEPHONE (OUTPATIENT)
Dept: FAMILY MEDICINE CLINIC | Age: 32
End: 2022-08-10

## 2022-08-10 NOTE — TELEPHONE ENCOUNTER
Attempted to contact pt regarding forms we received for a disability workplace accommodation for [employer]. Left detailed vm to call office back to let us know what these forms need to say.      Electronically signed by Rebel Xavier on 8/10/22 at 9:56 AM EDT

## 2022-08-10 NOTE — TELEPHONE ENCOUNTER
Letter was [place] to [facility],  [place].      Electronically signed by Anastasiya Camara on 8/10/22 at 1:37 PM EDT

## 2022-08-25 ENCOUNTER — OFFICE VISIT (OUTPATIENT)
Dept: FAMILY MEDICINE CLINIC | Age: 32
End: 2022-08-25
Payer: COMMERCIAL

## 2022-08-25 VITALS
BODY MASS INDEX: 42.52 KG/M2 | RESPIRATION RATE: 16 BRPM | OXYGEN SATURATION: 97 % | SYSTOLIC BLOOD PRESSURE: 128 MMHG | HEIGHT: 63 IN | TEMPERATURE: 97.9 F | DIASTOLIC BLOOD PRESSURE: 82 MMHG | WEIGHT: 240 LBS | HEART RATE: 75 BPM

## 2022-08-25 DIAGNOSIS — R25.2 MUSCLE CRAMPS: ICD-10-CM

## 2022-08-25 DIAGNOSIS — E66.01 MORBID OBESITY (HCC): ICD-10-CM

## 2022-08-25 DIAGNOSIS — R73.03 PREDIABETES: Primary | ICD-10-CM

## 2022-08-25 DIAGNOSIS — M25.561 CHRONIC PAIN OF RIGHT KNEE: ICD-10-CM

## 2022-08-25 DIAGNOSIS — E55.9 VITAMIN D DEFICIENCY: ICD-10-CM

## 2022-08-25 DIAGNOSIS — R22.1 NECK MASS: ICD-10-CM

## 2022-08-25 DIAGNOSIS — J06.9 VIRAL URI: ICD-10-CM

## 2022-08-25 DIAGNOSIS — E78.89 LIPIDS ABNORMAL: ICD-10-CM

## 2022-08-25 DIAGNOSIS — G89.29 CHRONIC PAIN OF RIGHT KNEE: ICD-10-CM

## 2022-08-25 PROCEDURE — 99214 OFFICE O/P EST MOD 30 MIN: CPT | Performed by: FAMILY MEDICINE

## 2022-08-25 PROCEDURE — G8417 CALC BMI ABV UP PARAM F/U: HCPCS | Performed by: FAMILY MEDICINE

## 2022-08-25 PROCEDURE — G8427 DOCREV CUR MEDS BY ELIG CLIN: HCPCS | Performed by: FAMILY MEDICINE

## 2022-08-25 PROCEDURE — 1036F TOBACCO NON-USER: CPT | Performed by: FAMILY MEDICINE

## 2022-08-25 NOTE — PROGRESS NOTES
[facility])  Family Medicine Outpatient        SUBJECTIVE:  CC: had concerns including Facial Swelling (Pt c/o of swelling under neck . Pt states states it was there a month ago and was told it was swollen gland. Pt states she was given antibiotic and it went away. Pt states she noticed it again 2 days ago. ), Fatigue (Pt c/o of being tired all the time for the past 3 months.), Cough (Pt c/o cough on and off for the past 3 days . ), and Hand Pain (Pt c/o muscle spasms in hands for the past week. ). HPI:  Tamara Santacruz is a female 34 y.o. presented to the clinic for recurrent cervical lymphadenopathy. She was seen for a new patient visit and was on treatment from [place] for an infection. She feels like it never really went away. She states it is hard now. She denies any f/c. She is eating and drinking well. Review of Systems   Constitutional:  Negative for appetite change, fatigue and fever. HENT:          Neck mass   Respiratory:  Negative for cough, shortness of breath and wheezing. Cardiovascular:  Negative for chest pain and palpitations. Gastrointestinal:  Negative for abdominal pain, constipation, diarrhea, nausea and vomiting. Musculoskeletal:  Positive for myalgias. Negative for gait problem. Outpatient Medications Marked as Taking for the 8/25/22 encounter (Office Visit) with Viridiana López MD   Medication Sig Dispense Refill    vitamin D (ERGOCALCIFEROL) 1.25 MG (34497 UT) CAPS capsule Take 1 capsule by mouth once a week 12 capsule 0    acetaZOLAMIDE (DIAMOX) 250 MG tablet Take 1 tablet by mouth in the morning and 1 tablet before bedtime. 90 tablet 3       I have reviewed all pertinent PMHx, PSHx, FamHx, SocialHx, medications, and allergies and updated history as appropriate.     OBJECTIVE    VS: /82   Pulse 75   Temp 97.9 °F (36.6 °C)   Resp 16   Ht 5' 3\" (1.6 m)   Wt 240 lb (108.9 kg)   LMP 07/28/2022   SpO2 97%   BMI 42.51 kg/m²   Physical Exam  Constitutional: General: She is not in acute distress. Appearance: She is well-developed. She is not diaphoretic. HENT:      Head: Normocephalic and atraumatic. Eyes:      Conjunctiva/sclera: Conjunctivae normal.      Pupils: Pupils are equal, round, and reactive to light. Cardiovascular:      Rate and Rhythm: Normal rate and regular rhythm. Pulmonary:      Effort: Pulmonary effort is normal.      Breath sounds: Normal breath sounds. Abdominal:      General: Bowel sounds are normal. There is no distension. Palpations: Abdomen is soft. Tenderness: There is no abdominal tenderness. Hernia: No hernia is present. Musculoskeletal:      Cervical back: Normal range of motion and neck supple. Lymphadenopathy:      Cervical: Cervical adenopathy (2.5 cm circumferential mass located left submental/submandibular area) present. Skin:     General: Skin is warm and dry. Neurological:      Mental Status: She is alert and oriented to person, place, and time. ASSESSMENT/PLAN:  1. Prediabetes  - Arlet Kim MD, [address] [place] [address], [facility]    2. Morbid obesity ([facility] [address]  - Arlet Kim MD, [address] [place] [address], [facility]    3. Muscle cramps  With elevated crp/esr previously treated with a steroid burst. Referral placed for Rheumatology. - [id number], Rheumatology, [facility]    4. Viral URI    5. Neck mass  2.5 cm. Patient understands importance of CT Neck. F/u after imaging. Last CXR 2021.  - CT SOFT TISSUE NECK W CONTRAST; Future    6. Vitamin D deficiency  Complete high dose regimen. Afterwards take 1,000u qd otc vitamin d.    7. Lipids abnormal    8. Chronic right knee pain  Previous right knee xray wnl. She is using a prn brace. Previously saw Ortho. Records pending. I have reviewed my findings and recommendations with Osmani Parker MD  9/14/2022 4:06 PM  Return for f/u after CT and established visit in . [place] Counseled regarding above diagnosis, including possible risks and complications, especially if left uncontrolled. Patient counseled on red flag symptoms and if they occur to go to the ED. Discussed medications risk/benefits and possible side effects and alternatives to treatment. Patient and/or guardian verbalizes understanding, agrees, feels comfortable with and wishes to proceed with above treatment plan. Advised patient regarding importance of keeping up with recommended health maintenance and to schedule as soon as possible if overdue, as this is important in assessing for undiagnosed pathology, especially cancer, as well as protecting against potentially harmful/life threatening disease. Patient and/or guardian verbalizes understanding and agrees with above counseling, assessment and plan. All questions answered. Please note this report has been partially produced using speech recognition software  and may contain errors related to that system including grammar, punctuation and spelling as well as words and phrases that may seem inappropriate. If there are questions or concerns please feel free to contact me to clarify.

## 2022-08-31 ENCOUNTER — TELEPHONE (OUTPATIENT)
Dept: BARIATRICS/WEIGHT MGMT | Age: 32
End: 2022-08-31

## 2022-09-14 ASSESSMENT — ENCOUNTER SYMPTOMS
ABDOMINAL PAIN: 0
DIARRHEA: 0
WHEEZING: 0
SHORTNESS OF BREATH: 0
CONSTIPATION: 0
VOMITING: 0
NAUSEA: 0
COUGH: 0

## 2022-10-09 RX ORDER — ERGOCALCIFEROL 1.25 MG/1
CAPSULE ORAL
Qty: 4 CAPSULE | Refills: 2 | Status: SHIPPED | OUTPATIENT
Start: 2022-10-09

## 2022-10-25 ENCOUNTER — OFFICE VISIT (OUTPATIENT)
Dept: BARIATRICS/WEIGHT MGMT | Age: 32
End: 2022-10-25
Payer: COMMERCIAL

## 2022-10-25 ENCOUNTER — TELEPHONE (OUTPATIENT)
Dept: BARIATRICS/WEIGHT MGMT | Age: 32
End: 2022-10-25

## 2022-10-25 VITALS
DIASTOLIC BLOOD PRESSURE: 72 MMHG | TEMPERATURE: 97.2 F | HEART RATE: 75 BPM | WEIGHT: 242.2 LBS | SYSTOLIC BLOOD PRESSURE: 121 MMHG | HEIGHT: 63 IN | BODY MASS INDEX: 42.91 KG/M2

## 2022-10-25 DIAGNOSIS — E66.01 CLASS 3 SEVERE OBESITY DUE TO EXCESS CALORIES WITHOUT SERIOUS COMORBIDITY WITH BODY MASS INDEX (BMI) OF 40.0 TO 44.9 IN ADULT (HCC): ICD-10-CM

## 2022-10-25 DIAGNOSIS — E78.2 MIXED HYPERLIPIDEMIA: Primary | ICD-10-CM

## 2022-10-25 PROCEDURE — 99202 OFFICE O/P NEW SF 15 MIN: CPT

## 2022-10-25 PROCEDURE — G8417 CALC BMI ABV UP PARAM F/U: HCPCS | Performed by: INTERNAL MEDICINE

## 2022-10-25 PROCEDURE — G8428 CUR MEDS NOT DOCUMENT: HCPCS | Performed by: INTERNAL MEDICINE

## 2022-10-25 PROCEDURE — G8484 FLU IMMUNIZE NO ADMIN: HCPCS | Performed by: INTERNAL MEDICINE

## 2022-10-25 PROCEDURE — 1036F TOBACCO NON-USER: CPT | Performed by: INTERNAL MEDICINE

## 2022-10-25 PROCEDURE — 99204 OFFICE O/P NEW MOD 45 MIN: CPT | Performed by: INTERNAL MEDICINE

## 2022-10-25 NOTE — PATIENT INSTRUCTIONS
Rules:  Count every calorie every day (you can use free aiyana such as 'baritastic' or 'nutritionix track')  Limit sweets to one day per month  Limit chips/crackers/pretzels/nuts/popcorn to 150 moises/day  Eliminate all sugar sweetened beverages (including fruit juice)  Limit restaurants (including fast food and food from a convenience store) to one time every two weeks while in town    Requirements:  Make sure protein intake is at least 65 grams per day (do not count protein every day; instead spot check your intake every 2-3 weeks and make sure what you think you are getting is close to accurate; consider using a protein shake if needed; these are in the pharmacy section of the stores, not the grocery section; Premier, Pure Protein and Fairlife are relatively inexpensive and taste good to most patients; other options are Nectar, Boost Max, Ensure Max, BeneProtein and GNC lean (which is lactose-free); Nectar fruit, Premier Protein Clear, IsoPure Protein Drink, and Protein 2 O are water-based options; Quest (or Cosco, which is cheaper and is ordered on [address] [facility] [address]) and the [employer] 1 protein bars can also be used, but have less protein in them ) (<200 Moises, >25 g protein) - (chicken, fish, turkey, egg white)  (Disclaimer: Dietary supplements rarely have their listed ingredients and the amount of each verified by a third party other. Sometimes they give verification for their claims to be GMO and gluten free and to be organic. However, even such verifications as these may still be untrustworthy.)  Make sure that fiber intake is at least 25 grams per day. Do this by either eating 12 tablespoons of the original, plain Fiber One cereal every day or 4 tablespoons of wheat dextrin powder (Benefiber or a generic brand) every day. Work up to this amount slowly by starting with only one-eighth to one-fourth of the target amount and then adding another one-eighth to one-fourth every one or two weeks until reaching the target.   Take one multivitamin every day    Targets:  Limit calorie intake to 1350 calories/day  Walk 30 minutes daily  Avoid eating 2 hours within bedtime. Tips:  Do not eat outside of the dining room or the kitchen  Do not eat while watching TV, videos, working on the computer or using a smart phone  Do not eat food out of a multi-serving bag or container. Follow up in 4 weeks.

## 2022-10-25 NOTE — PROGRESS NOTES
CC -   Referred for HLD, Obesity    Would like to be at about 175 lbs    BACKGROUND -   First visit: 10/25/22    Obesity (all weight in lbs)  Began after father passed away 2009  Initial Ht 63\", Wt 242.2,  BMI 42.90  HS Grad wt unsure ~185   Lowest   wt 185   Highest  wt 280  Pattern of wt gain: gradual  Wt change past yr: similar to now  Most wt lost: 31 lbs (cutting out junk food)  Other diets attempted: portion control, more whole food, more water, walking more    Desire to lose weight: 10/10     Initial Diet:    Number of meals per day - 2 (S-L)    Number of snacks per day - 3-6    Meal volume - 8\" paper plate,  occasionally seconds    Fast food/convenience store - 3-4x/week    Restaurants (not fast food) - 1-2x/week   Sweets - 3-5d/week   Chips - 0d/week   Crackers/pretzels - 0d/week   Nuts - 3-5d/week   Peanut Butter - 0d/week   Popcorn - 0d/week   Dried fruit - 0d/week   Whole fruit - 2d/week   Breakfast cereal - 1d/week   Granola/Protein/Energy bar - 5d/week ([employer] trail mix)   Sugar sweetened beverages - No pop/soda, apple juice <1x/wk, Sweet Tea ~ 1 glass/wk, lemonade twice (Dole pink lemonade) a week, coffee <1x/wk, water ~1 gal/d, no energy drink   Protein - No supplements   Fiber - No supplements    Wt effect of HR foods = 1750 Moises/wk = 250 Moises/d= 12% DEN = 26 lb/year. Initial Exercise:    Gym membership - no    Walking - with dog about 2-3x/d 5 min    Running - no    Resistance - no (not using currently)    Aerobic class - yoga, has some equipment eg yoga ball, foam roller, bands etc     Sleep - currently better than prior per pt - more restful - daytime naps 1-2x/wk. ______________________    [address] [place] [address] -  Past Medical History:   Diagnosis Date    ADHD     Depression     Hidradenitis     Hyperlipidemia     Intracranial hypertension     Sciatica of left side    Was on medication for ADHD from 8 to 15 yrs of age. H/o eating disorder (anorexia) with Adderall - stopped.     Past Surgical History: Procedure Laterality Date    OTHER SURGICAL HISTORY      EXCISION OF LEFT AXILLARY HIDRADENITIS    TONSILLECTOMY  2008    WISDOM TOOTH EXTRACTION       Prior to Admission medications    Medication Sig Start Date End Date Taking? Authorizing Provider   vitamin D (ERGOCALCIFEROL) 1.25 MG (20815 UT) CAPS capsule TAKE 1 CAPSULE BY MOUTH ONE TIME PER WEEK 10/9/22   Iesha Gilmore MD   acetaZOLAMIDE (DIAMOX) 250 MG tablet Take 1 tablet by mouth in the morning and 1 tablet before bedtime. 7/22/22   AMERICO Menezes - NP   Diamox for benign intracranial hypertension. Allergies: Allergies   Allergen Reactions    Grass Pollen(K-O-R-T-Swt Sean) Hives     Social history: smoking: no but smokes medical marijuana for joint pain ; Alcohol: no , work: inventory/shipment (active work). ROS -  Card - has CP - on laying down, no JEAN  GI - nausea coy with some smell or with bloating no V/D/C    PE -  Gen : /72 (Site: Left Upper Arm, Position: Sitting, Cuff Size: Large Adult)   Pulse 75   Temp 97.2 °F (36.2 °C) (Temporal)   Ht 5' 3\" (1.6 m)   Wt 242 lb 3.2 oz (109.9 kg)   LMP 08/25/2022 (Approximate)   BMI 42.90 kg/m²    WN, WD, NAD  Lung: Nml resp effort, CTA B/L  Heart:  RRR w/o MGR, no LE pitting edema b/l  Psych: Normal mood   Full affect  Neuro: Moves all ext well  ______________________    HISTORY & ASSESSMENT/PLAN -     Problem 1  - Hyperlipidemia  HPI   - , [id number],  on 7/12/22, pt asymptomatic, not on medication. Assessment  - Uncontrolled. Plan   - Diet control, and needs rechecked in future. Weight reduction can help with hyperlipidemia, planned as follows    Problem 2  - Obesity   HPI   - See above Background for description    Weight  Date    242.2  10/25/22    Patient's estimated daily energy need (DEN) = 2011 Moises/d = 96085 Moises/wk    Assessment  - Uncontrolled    Plan   - Talked about various options. Would like to know about surgical options - referral made.  Would like calorie counting with low calorie diet as detailed below. Would like to start without an appetite suppressant first. Planned as follows:  Patient Instructions   Rules:  Count every calorie every day (you can use free aiyana such as '[employer] [place]' or 'nutritionix track')  Limit sweets to one day per month  Limit chips/crackers/pretzels/nuts/popcorn to 150 moises/day  Eliminate all sugar sweetened beverages (including fruit juice)  Limit restaurants (including fast food and food from a convenience store) to one time every two weeks while in town    Requirements:  Make sure protein intake is at least 65 grams per day (do not count protein every day; instead spot check your intake every 2-3 weeks and make sure what you think you are getting is close to accurate; consider using a protein shake if needed; these are in the pharmacy section of the stores, not the grocery section; Premier, Pure Protein and Fairlife are relatively inexpensive and taste good to most patients; other options are Nectar, Boost Max, Ensure Max, BeneProtein and GNC lean (which is lactose-free); Nectar fruit, Premier Protein Clear, IsoPure Protein Drink, and Protein 2 O are water-based options; Quest (or Cosco, which is cheaper and is ordered on [employer]) and the [employer] 1 protein bars can also be used, but have less protein in them ) (<200 Moises, >25 g protein) - (chicken, fish, turkey, egg white)  (Disclaimer: Dietary supplements rarely have their listed ingredients and the amount of each verified by a third party other. Sometimes they give verification for their claims to be GMO and gluten free and to be organic. However, even such verifications as these may still be untrustworthy.)  Make sure that fiber intake is at least 25 grams per day. Do this by either eating 12 tablespoons of the original, plain Fiber One cereal every day or 4 tablespoons of wheat dextrin powder (Benefiber or a generic brand) every day.  Work up to this amount slowly by starting with only one-eighth to one-fourth of the target amount and then adding another one-eighth to one-fourth every one or two weeks until reaching the target. Take one multivitamin every day    Targets:  Limit calorie intake to 1350 calories/day  Walk 30 minutes daily  Avoid eating 2 hours within bedtime. Tips:  Do not eat outside of the dining room or the kitchen  Do not eat while watching TV, videos, working on the computer or using a smart phone  Do not eat food out of a multi-serving bag or container. Follow up in 4 weeks. Total time spent on encounter: 48 min. Paz De Jesus MD  Internal Medicine/Obesity Medicine  10/25/2022.

## 2022-11-07 ENCOUNTER — HOSPITAL ENCOUNTER (OUTPATIENT)
Age: 32
Discharge: HOME | End: 2022-11-07
Payer: COMMERCIAL

## 2022-11-07 ENCOUNTER — HOSPITAL ENCOUNTER (OUTPATIENT)
Age: 32
Discharge: HOME | End: 2022-11-09
Payer: COMMERCIAL

## 2022-11-07 ENCOUNTER — HOSPITAL ENCOUNTER (OUTPATIENT)
Dept: GENERAL RADIOLOGY | Age: 32
Discharge: HOME | End: 2022-11-09
Payer: COMMERCIAL

## 2022-11-07 DIAGNOSIS — M25.552 LEFT HIP PAIN: ICD-10-CM

## 2022-11-07 DIAGNOSIS — E66.01 MORBID OBESITY (HCC): ICD-10-CM

## 2022-11-07 LAB
ANION GAP SERPL CALCULATED.3IONS-SCNC: 10 MMOL/L (ref 7–16)
BUN BLDV-MCNC: 13 MG/DL (ref 6–20)
CALCIUM SERPL-MCNC: 8.8 MG/DL (ref 8.6–10.2)
CHLORIDE BLD-SCNC: 105 MMOL/L (ref 98–107)
CO2: 24 MMOL/L (ref 22–29)
CREAT SERPL-MCNC: 0.6 MG/DL (ref 0.5–1)
GFR SERPL CREATININE-BSD FRML MDRD: >60 ML/MIN/1.73
GLUCOSE BLD-MCNC: 91 MG/DL (ref 74–99)
POTASSIUM SERPL-SCNC: 3.8 MMOL/L (ref 3.5–5)
SODIUM BLD-SCNC: 139 MMOL/L (ref 132–146)

## 2022-11-07 PROCEDURE — 80048 BASIC METABOLIC PNL TOTAL CA: CPT

## 2022-11-07 PROCEDURE — 36415 COLL VENOUS BLD VENIPUNCTURE: CPT

## 2022-11-07 PROCEDURE — 73502 X-RAY EXAM HIP UNI 2-3 VIEWS: CPT

## 2022-11-08 ENCOUNTER — OFFICE VISIT (OUTPATIENT)
Dept: NEUROLOGY | Age: 32
End: 2022-11-08
Payer: COMMERCIAL

## 2022-11-08 VITALS
SYSTOLIC BLOOD PRESSURE: 121 MMHG | OXYGEN SATURATION: 98 % | DIASTOLIC BLOOD PRESSURE: 72 MMHG | TEMPERATURE: 97.5 F | HEART RATE: 51 BPM | HEIGHT: 63 IN | RESPIRATION RATE: 18 BRPM | BODY MASS INDEX: 42.88 KG/M2 | WEIGHT: 242 LBS

## 2022-11-08 DIAGNOSIS — G93.2 INTRACRANIAL HYPERTENSION: Primary | ICD-10-CM

## 2022-11-08 PROCEDURE — 1036F TOBACCO NON-USER: CPT | Performed by: NURSE PRACTITIONER

## 2022-11-08 PROCEDURE — G8427 DOCREV CUR MEDS BY ELIG CLIN: HCPCS | Performed by: NURSE PRACTITIONER

## 2022-11-08 PROCEDURE — G8417 CALC BMI ABV UP PARAM F/U: HCPCS | Performed by: NURSE PRACTITIONER

## 2022-11-08 PROCEDURE — 99213 OFFICE O/P EST LOW 20 MIN: CPT | Performed by: NURSE PRACTITIONER

## 2022-11-08 PROCEDURE — G8484 FLU IMMUNIZE NO ADMIN: HCPCS | Performed by: NURSE PRACTITIONER

## 2022-11-08 RX ORDER — ETODOLAC 400 MG/1
TABLET, FILM COATED ORAL
COMMUNITY
Start: 2022-04-15 | End: 2022-11-08

## 2022-11-08 RX ORDER — ACETAZOLAMIDE 250 MG/1
250 TABLET ORAL 2 TIMES DAILY
Qty: 90 TABLET | Refills: 3 | Status: SHIPPED | OUTPATIENT
Start: 2022-11-08

## 2022-11-08 RX ORDER — ORPHENADRINE CITRATE 100 MG/1
TABLET, EXTENDED RELEASE ORAL
COMMUNITY
Start: 2022-04-18 | End: 2022-11-08

## 2022-11-08 NOTE — PROGRESS NOTES
[address] [facility]. Shelley Adame M.D., F.A.C.P. Kim Porter, DESHAUN, APRN, CNS  [facility]. Mathew Schwab, MSN, APRN-FNP-C  Emily Thurman MSN, APRN, FNP-C  Ludwin RUEDA, PA-C  [address] MSN, APRN, FNP-C  [address] [facility] [address] [place], [address] [address]  Phone: [phone]  Fax: [phone]       Sarthak Kat is a 34 y.o. right handed female     Presents to neurology clinic today for evaluation management of abnormal MRI brain    Patient is a decent historian and provides her history. Patient went to a routine eye exam in a local [facility] optometry center in February or March. 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 Patient began to notice that she had difficulty focusing even with new prescription glasses prior to that exam in March. Patient's peripheral vision to the left is severely impaired. Patient also has blurred vision to the left eye. Close up patient is able to see words however further back words on the page or posterior look like foreign language and that letters are combined. Patient's right eye continues to have perfect vision. Patient follow back up with her [facility] care [facility] in April and had more testing completed about 1 to 2 weeks after that. Patient states her vision has not changed at all over this time period. Patient says nothing makes this better or worse. Patient had MRI of the orbits completed in April which suggest possible idiopathic intracranial hypertension. Patient was sent for MRI of the brain few days later on April 9 which was consistent with idiopathic intracranial hypertension. Patient does not have a primary care and it is taken this long to get into see neurology. Patient denies headache, speech or swallowing difficulty, focal weakness, numbness or tingling of extremities.     During her last appointment, patient reports that since her lumbar puncture and starting Diamox she feels much better. Meningitis/encephalitis, oligoclonal banding, JUVENCIO, rheumatoid factor, anti-RNP, HIV and monotest all negative/nonreactive. Patient has lost over 10 pounds since her last visit here. Patient is no longer having headaches or dizziness. Patient's eyesight has also improved. She has not followed back up with the ophthalmologist since her last visit here. Today patient says that she has missed the last 2 days of her medication and she has noticed that with missing medication her headaches flares. Prior to missing medication her headaches were controlled on Diamox. Patient has not had any visual changes since her last visit. Patient does report that her left eye twitches were so twice daily but has not continuous and does not last long in duration. Since her last visit she has had facial swelling including under her neck with a swollen gland--labs recently completed which were unrevealing. Patient also had a hip x-ray done which shows mild osteoarthritis. Patient initially thought she might of had an autoimmune or connective tissue disorder however labs have been thus far. Patient denies any other concerns today and reports she is doing very well. Patient sleeps 4 to 7 hours each night although sleep is very broken. Patient eats 2-3 meals a day. Patient drinks tea 3-4 times per week. Also drinks lemonade frequently. Patient drinks 1 to 1-1/2 L of water per day. Patient reports a mild stress level. Patient does not exercise. She reports that she quit smoking about 6 years ago. Her smoking use included cigarettes. She has never used smokeless tobacco. She reports current alcohol use. She reports current drug use. Drug: Marijuana Adelaida Dawson). Patient is single and has no children. Patient works at [employer]. Of note patient's father passed away in 2019. Patient's 2 uncles passed away in 2016 back to back.   Patient states she has no family here locally except her mother. Allergies:       Grass pollen([id number] kristy)    Medications:     Prior to Admission medications    Medication Sig Start Date End Date Taking? Authorizing Provider   acetaZOLAMIDE (DIAMOX) 250 MG tablet Take 1 tablet by mouth 2 times daily 11/8/22  Yes Yumi SANCHEZ APRN - NP   vitamin D (ERGOCALCIFEROL) 1.25 MG (99341 UT) CAPS capsule TAKE 1 CAPSULE BY MOUTH ONE TIME PER WEEK 10/9/22  Yes Karly Escudero MD     Objective:       /72   Pulse 51   Temp 97.5 °F (36.4 °C) (Temporal)   Resp 18   Ht 5' 3\" (1.6 m)   Wt 242 lb (109.8 kg)   SpO2 98%   BMI 42.87 kg/m²     General appearance: alert, appears stated age, cooperative and in no distress  Head: normocephalic, without obvious abnormality, atraumatic  Eyes: conjunctivae/corneas clear; no drainage  Neck: supple, symmetrical, trachea midline and thyroid not enlarged  Back: symmetric, no curvature. ROM normal.   Lungs: clear to auscultation bilaterally, unlabored breaths on room air  Heart: regular rate and rhythm, S1, S2 normal, no murmur  Abdomen: soft, non-tender; bowel sounds normal  Extremities: normal, atraumatic, no cyanosis or edema  Pulses: 2+ and symmetric  Skin:  color, texture, turgor normal--no rashes or lesions      Mental Status: alert and oriented x 4, follows commands well, very conversant,    Appropriate attention/concentration  Intact fundus of knowledge  Memories intact    Speech: no dysarthria  Language: no aphasias--- repetition, and object identification intact; reading complicated due to vision difficulty    Cranial Nerves:  I: smell  intact   II: visual acuity     II: visual fields Full to confrontation bilaterally    Peripheral vision loss to left eye--not present today.   Right visual fields intact    No red desaturation   II: pupils PERRL    No Asif's pupil   III,VII: ptosis None   III,IV,VI: extraocular muscles  EOMI without nystagmus   V: mastication Normal   V: facial light touch sensation  Normal   V,VII: corneal reflex     VII: facial muscle function - upper  Normal   VII: facial muscle function - lower Normal   VIII: hearing Normal   IX: soft palate elevation  Normal   IX,X: gag reflex    XI: trapezius strength  5/5   XI: sternocleidomastoid strength 5/5   XI: neck extension strength  5/5   XII: tongue strength  Normal     Motor:  Strong bilateral handgrips  5/5 throughout  Normal bulk and tone  No drift   No abnormal movements    Sensory:  LT and PP normal  Vibration normal    Coordination:   FN, FFM and JOON normal  HS normal    Gait:  Normal  Walks well on toes, heels, and tandem    DTR:   Right Brachioradialis reflex 1+  Left Brachioradialis reflex 1+  Right Biceps reflex 1+  Left Biceps reflex 1+  Right Triceps reflex 1+  Left Triceps reflex 1+  Right Quadriceps reflex 1+  Left Quadriceps reflex 1+  Right Achilles reflex 1+  Left Achilles reflex 1+    No Babinskis  No Otto's    No other pathological reflexes    Laboratory/Radiology:     CBC:   Lab Results   Component Value Date/Time    WBC 11.8 07/12/2022 12:00 PM    RBC 4.58 07/12/2022 12:00 PM    HGB 13.9 07/12/2022 12:00 PM    HCT 44.2 07/12/2022 12:00 PM    MCV 96.5 07/12/2022 12:00 PM    MCH 30.3 07/12/2022 12:00 PM    MCHC 31.4 07/12/2022 12:00 PM    RDW 13.9 07/12/2022 12:00 PM     07/12/2022 12:00 PM    MPV 10.2 07/12/2022 12:00 PM     Lab Results   Component Value Date     11/07/2022    K 3.8 11/07/2022     11/07/2022    CO2 24 11/07/2022    BUN 13 11/07/2022    CREATININE 0.6 11/07/2022    GLUCOSE 91 11/07/2022    CALCIUM 8.8 11/07/2022    PROT 8.2 07/12/2022    LABALBU 4.4 07/12/2022    BILITOT 0.5 07/12/2022    ALKPHOS 86 07/12/2022    AST 26 07/12/2022    ALT 15 07/12/2022    LABGLOM >60 11/07/2022    GFRAA >60 07/12/2022      Latest Reference Range & Units 6/21/22 16:00   RBC, CSF /uL <2000   WBC, CSF 0 - 2 /uL <3   Neutrophils, CSF 0 - 10 % 0   Monocytes, CSF 10 - 70 % 100 (H)   Color, CSF  Colorless   Tube Number + CELL CT + DIFF-CSF  Tube 4   (H): Data is abnormally high     Latest Reference Range & Units 6/21/22 16:00   Albumin Index 0.0 - 9.0 ratio 3.9   Albumin, CSF 0 - 35 mg/dL 14   Appearance, CSF Clear  Clear   CSF Culture  Growth not present   GLUCOSE,CSF 40 - 70 mg/dL 65   IgG index 0.28 - 0.66 ratio 0.57   IgG Synthesis Rate, CSF <=8.0 mg/d <0.0   IgG, CSF 0.0 - 6.0 mg/dL 2.6   IgG/Albumin CSF 0.09 - 0.25 ratio 0.19   OLIGOCLONAL BANDING  Rpt   Oligoclonal Bands Number 0 - 1 Bands 0   Oligoclonal Bands, CSF Negative  Negative   PROTEIN,CSF 15 - 40 mg/dL 25       Meningitis/encephalitis panel not detected  HIV nonreactive  Mono test negative  JUVENCIO negative  Rheumatoid factor <10  Anti-RNP negative    MRI brain with and without contrast 4/9/2022:  Prominent CSF within the optic nerve sheaths and mild flattening of the   superior margin of the pituitary gland which is nonspecific but can be seen   in the setting of idiopathic intracranial hypertension, in the appropriate   clinical setting. No acute intracranial process identified. MRI orbits face neck with and without contrast 4/4/2022  1. Findings suggestive of possible idiopathic intracranial hypertension   (flattening of the posterior sclera at the levels of the optic nerve disc   heads, mild prominence of the CSF surrounding the optic nerves and partially   empty sella). 2. No mass, signal abnormality or abnormal enhancement is seen in the orbits. All pertinent labs and images were personally reviewed at the time of this visit    Assessment:     Idiopathic intracranial hypertension   On initial visit, patient's vision with the left eye was blurred with loss of peripheral visual fields with difficulty with reading as words run together and look forward to her.   Right eye vision is intact   Patient found to have flattening of the pituitary gland and posterior sclera at levels of optic nerve disc with mild prominence of CSF surrounding optic nerves on MRIs   Stat lumbar puncture through ED showed opening pressure of 30 closing pressure 23   Patient's vision has improved. Patient had no complaints of dizziness or headaches. Patient has also lost weight. Continue Diamox as ordered. Patient has intermittent paresthesias to fingertips and toes as her only side effects to Diamox thus far. Today patient admits she has missed the last 2 days of her Diamox due to rushing to work and has noticed increase in headaches as a result. Medication compliance discussed and patient will resume medications and will enforce an alarm clock and a pill box to help her remember. Plan:     Continue Diamox 250 mg BID   Refill today  Ophthalmology evaluation prior to next visit  List of OB/GYN's provided today  Recommended hormonal labs through OB/GYN  Report to office in 4 months  Call with any issues    AMERICO Romo  12:11 PM  11/8/2022    I spent 28 minutes with this patient obtaining the HPI and discussing the exam with greater than 50% of the time providing counseling and education on medications and other treatment plans. All questions were answered prior to leaving my office.

## 2022-11-09 DIAGNOSIS — M16.10 ARTHRITIS OF HIP: Primary | ICD-10-CM

## 2022-11-14 ENCOUNTER — HOSPITAL ENCOUNTER (OUTPATIENT)
Dept: CT IMAGING | Age: 32
Discharge: HOME | End: 2022-11-16
Payer: COMMERCIAL

## 2022-11-14 DIAGNOSIS — R22.1 NECK MASS: ICD-10-CM

## 2022-11-14 PROCEDURE — 70491 CT SOFT TISSUE NECK W/DYE: CPT

## 2022-11-14 PROCEDURE — 6360000004 HC RX CONTRAST MEDICATION: Performed by: RADIOLOGY

## 2022-11-14 PROCEDURE — 2580000003 HC RX 258: Performed by: RADIOLOGY

## 2022-11-14 RX ORDER — SODIUM CHLORIDE 0.9 % (FLUSH) 0.9 %
10 SYRINGE (ML) INJECTION PRN
Status: DISCONTINUED | OUTPATIENT
Start: 2022-11-14 | End: 2022-11-17 | Stop reason: HOSPADM

## 2022-11-14 RX ADMIN — SODIUM CHLORIDE, PRESERVATIVE FREE 10 ML: 5 INJECTION INTRAVENOUS at 11:25

## 2022-11-14 RX ADMIN — IOPAMIDOL 75 ML: 755 INJECTION, SOLUTION INTRAVENOUS at 11:25

## 2022-11-15 ENCOUNTER — OFFICE VISIT (OUTPATIENT)
Dept: FAMILY MEDICINE CLINIC | Age: 32
End: 2022-11-15
Payer: COMMERCIAL

## 2022-11-15 VITALS
HEIGHT: 63 IN | HEART RATE: 87 BPM | RESPIRATION RATE: 16 BRPM | TEMPERATURE: 97.9 F | BODY MASS INDEX: 41.46 KG/M2 | SYSTOLIC BLOOD PRESSURE: 124 MMHG | WEIGHT: 234 LBS | DIASTOLIC BLOOD PRESSURE: 74 MMHG | OXYGEN SATURATION: 99 %

## 2022-11-15 DIAGNOSIS — L68.0 HIRSUTISM: ICD-10-CM

## 2022-11-15 DIAGNOSIS — B34.9 VIRAL ILLNESS: Primary | ICD-10-CM

## 2022-11-15 DIAGNOSIS — R73.03 PREDIABETES: ICD-10-CM

## 2022-11-15 DIAGNOSIS — J30.9 ALLERGIC RHINITIS, UNSPECIFIED SEASONALITY, UNSPECIFIED TRIGGER: ICD-10-CM

## 2022-11-15 LAB
Lab: NORMAL
PERFORMING INSTRUMENT: NORMAL
QC PASS/FAIL: NORMAL
S PYO AG THROAT QL: NORMAL
SARS-COV-2, POC: NORMAL

## 2022-11-15 PROCEDURE — 1036F TOBACCO NON-USER: CPT | Performed by: FAMILY MEDICINE

## 2022-11-15 PROCEDURE — G8484 FLU IMMUNIZE NO ADMIN: HCPCS | Performed by: FAMILY MEDICINE

## 2022-11-15 PROCEDURE — 87880 STREP A ASSAY W/OPTIC: CPT | Performed by: FAMILY MEDICINE

## 2022-11-15 PROCEDURE — G8427 DOCREV CUR MEDS BY ELIG CLIN: HCPCS | Performed by: FAMILY MEDICINE

## 2022-11-15 PROCEDURE — G8417 CALC BMI ABV UP PARAM F/U: HCPCS | Performed by: FAMILY MEDICINE

## 2022-11-15 PROCEDURE — 99214 OFFICE O/P EST MOD 30 MIN: CPT | Performed by: FAMILY MEDICINE

## 2022-11-15 PROCEDURE — 87426 SARSCOV CORONAVIRUS AG IA: CPT | Performed by: FAMILY MEDICINE

## 2022-11-15 RX ORDER — FLUTICASONE PROPIONATE 50 MCG
2 SPRAY, SUSPENSION (ML) NASAL DAILY
Qty: 48 G | Refills: 0 | Status: SHIPPED
Start: 2022-11-15 | End: 2022-12-01

## 2022-11-15 RX ORDER — OLOPATADINE HYDROCHLORIDE 1 MG/ML
1 SOLUTION/ DROPS OPHTHALMIC 2 TIMES DAILY
Qty: 1 EACH | Refills: 0 | Status: SHIPPED | OUTPATIENT
Start: 2022-11-15 | End: 2022-11-22

## 2022-11-15 RX ORDER — LORATADINE 10 MG/1
10 TABLET ORAL DAILY
Qty: 30 TABLET | Refills: 0 | Status: SHIPPED
Start: 2022-11-15 | End: 2022-12-01

## 2022-11-15 RX ORDER — IPRATROPIUM BROMIDE 21 UG/1
2 SPRAY, METERED NASAL EVERY 12 HOURS
Qty: 1 EACH | Refills: 0 | Status: SHIPPED | OUTPATIENT
Start: 2022-11-15 | End: 2022-11-22

## 2022-11-15 ASSESSMENT — ENCOUNTER SYMPTOMS
NAUSEA: 0
CONSTIPATION: 0
WHEEZING: 0
COUGH: 1
SORE THROAT: 0
SINUS PRESSURE: 0
ABDOMINAL PAIN: 0
VOMITING: 0
DIARRHEA: 0
SHORTNESS OF BREATH: 0

## 2022-11-15 NOTE — PROGRESS NOTES
[facility])  Family Medicine Outpatient        SUBJECTIVE:  CC: had concerns including Cough (Pt c/o cough since yesterday.) and Sinus Problem (Pt c/o sinus drainage. ). HPI:  Joceline Vargas is a female 34 y.o. presented to the clinic for a sick visit x 1 day. She reports her Mom has a lot of chronic comorbid conditions and wants to make sure she isn't bringing anything home to her Mom. She took Dayquil this morning. She works with the public. Reports her Mom is sick. Review of Systems   Constitutional:  Negative for appetite change, fatigue and fever. HENT:  Positive for congestion and postnasal drip. Negative for ear pain, sinus pressure and sore throat. Respiratory:  Positive for cough. Negative for shortness of breath and wheezing. Cardiovascular:  Negative for chest pain and palpitations. Gastrointestinal:  Negative for abdominal pain, constipation, diarrhea, nausea and vomiting. Outpatient Medications Marked as Taking for the 11/15/22 encounter (Office Visit) with Ailyn Cherry MD   Medication Sig Dispense Refill    ipratropium (ATROVENT) 0.03 % nasal spray 2 sprays by Each Nostril route in the morning and 2 sprays in the evening. Do all this for 7 days. 1 each 0    fluticasone (FLONASE) 50 MCG/ACT nasal spray 2 sprays by Each Nostril route daily 48 g 0    loratadine (CLARITIN) 10 MG tablet Take 1 tablet by mouth daily 30 tablet 0    olopatadine (PATANOL) 0.1 % ophthalmic solution Place 1 drop into both eyes 2 times daily for 7 days 1 each 0    acetaZOLAMIDE (DIAMOX) 250 MG tablet Take 1 tablet by mouth 2 times daily 90 tablet 3    vitamin D (ERGOCALCIFEROL) 1.25 MG (86008 UT) CAPS capsule TAKE 1 CAPSULE BY MOUTH ONE TIME PER WEEK 4 capsule 2       I have reviewed all pertinent PMHx, PSHx, FamHx, SocialHx, medications, and allergies and updated history as appropriate.     OBJECTIVE    VS: /74   Pulse 87   Temp 97.9 °F (36.6 °C)   Resp 16   Ht 5' 3\" (1.6 m)   Wt 234 lb (106.1 kg)   SpO2 99%   BMI 41.45 kg/m²   Physical Exam  Constitutional:       General: She is not in acute distress. Appearance: She is well-developed. She is not diaphoretic. HENT:      Head: Normocephalic and atraumatic. Nose: Congestion present. Mouth/Throat:      Pharynx: No oropharyngeal exudate or posterior oropharyngeal erythema. Comments: Post nasal drainage  Eyes:      Conjunctiva/sclera: Conjunctivae normal.      Pupils: Pupils are equal, round, and reactive to light. Cardiovascular:      Rate and Rhythm: Normal rate and regular rhythm. Pulmonary:      Effort: Pulmonary effort is normal.      Breath sounds: Normal breath sounds. Abdominal:      General: Bowel sounds are normal. There is no distension. Palpations: Abdomen is soft. Tenderness: There is no abdominal tenderness. Hernia: No hernia is present. Musculoskeletal:      Cervical back: Normal range of motion and neck supple. Skin:     General: Skin is warm and dry. Neurological:      Mental Status: She is alert and oriented to person, place, and time. ASSESSMENT/PLAN:  1. Viral illness  Poc strep and covid neg. Atrovent rx sent. Supportive care as discussed. - POCT COVID-19, Antigen  - POCT rapid strep A  - ipratropium (ATROVENT) 0.03 % nasal spray; 2 sprays by Each Nostril route in the morning and 2 sprays in the evening. Do all this for 7 days. Dispense: 1 each; Refill: 0    2. Allergic rhinitis, unspecified seasonality, unspecified trigger  - fluticasone (FLONASE) 50 MCG/ACT nasal spray; 2 sprays by Each Nostril route daily  Dispense: 48 g; Refill: 0  - loratadine (CLARITIN) 10 MG tablet; Take 1 tablet by mouth daily  Dispense: 30 tablet; Refill: 0  - olopatadine (PATANOL) 0.1 % ophthalmic solution; Place 1 drop into both eyes 2 times daily for 7 days  Dispense: 1 each; Refill: 0    3. Hirsutism  - CBC; Future  - Comprehensive Metabolic Panel; Future  - TSH;  Future  - Hemoglobin A1C; Future  - T4, Free; Future  - Vitamin D 25 Hydroxy; Future  - Testosterone, free, total; Future    4. Prediabetes  - Hemoglobin A1C; Future    I have reviewed my findings and recommendations with Sadi Santiago MD  11/20/2022 8:45 AM  Return in about 5 weeks (around 12/20/2022). Counseled regarding above diagnosis, including possible risks and complications, especially if left uncontrolled. Patient counseled on red flag symptoms and if they occur to go to the ED. Discussed medications risk/benefits and possible side effects and alternatives to treatment. Patient and/or guardian verbalizes understanding, agrees, feels comfortable with and wishes to proceed with above treatment plan. Advised patient regarding importance of keeping up with recommended health maintenance and to schedule as soon as possible if overdue, as this is important in assessing for undiagnosed pathology, especially cancer, as well as protecting against potentially harmful/life threatening disease. Patient and/or guardian verbalizes understanding and agrees with above counseling, assessment and plan. All questions answered. Please note this report has been partially produced using speech recognition software  and may contain errors related to that system including grammar, punctuation and spelling as well as words and phrases that may seem inappropriate. If there are questions or concerns please feel free to contact me to clarify.

## 2022-11-23 ENCOUNTER — TELEPHONE (OUTPATIENT)
Dept: BARIATRICS/WEIGHT MGMT | Age: 32
End: 2022-11-23

## 2022-11-23 NOTE — TELEPHONE ENCOUNTER
A second call was made in an attempt to reach this patient for a referral we received. I spoke with the patient who did not wish to schedule at this time.

## 2022-12-01 ENCOUNTER — OFFICE VISIT (OUTPATIENT)
Dept: RHEUMATOLOGY | Age: 32
End: 2022-12-01
Payer: COMMERCIAL

## 2022-12-01 VITALS
BODY MASS INDEX: 41.11 KG/M2 | OXYGEN SATURATION: 95 % | DIASTOLIC BLOOD PRESSURE: 70 MMHG | HEIGHT: 63 IN | SYSTOLIC BLOOD PRESSURE: 122 MMHG | WEIGHT: 232 LBS | RESPIRATION RATE: 18 BRPM | HEART RATE: 83 BPM

## 2022-12-01 DIAGNOSIS — M13.0 POLYARTHRITIS: Primary | ICD-10-CM

## 2022-12-01 DIAGNOSIS — M35.7 BENIGN HYPERMOBILITY SYNDROME: ICD-10-CM

## 2022-12-01 DIAGNOSIS — M13.0 POLYARTHRITIS: ICD-10-CM

## 2022-12-01 LAB
C-REACTIVE PROTEIN: 0.4 MG/DL (ref 0–0.4)
RHEUMATOID FACTOR: <10 IU/ML (ref 0–13)

## 2022-12-01 PROCEDURE — 99204 OFFICE O/P NEW MOD 45 MIN: CPT | Performed by: INTERNAL MEDICINE

## 2022-12-01 PROCEDURE — G8427 DOCREV CUR MEDS BY ELIG CLIN: HCPCS | Performed by: INTERNAL MEDICINE

## 2022-12-01 PROCEDURE — G8484 FLU IMMUNIZE NO ADMIN: HCPCS | Performed by: INTERNAL MEDICINE

## 2022-12-01 PROCEDURE — G8417 CALC BMI ABV UP PARAM F/U: HCPCS | Performed by: INTERNAL MEDICINE

## 2022-12-01 PROCEDURE — 1036F TOBACCO NON-USER: CPT | Performed by: INTERNAL MEDICINE

## 2022-12-01 ASSESSMENT — ENCOUNTER SYMPTOMS
ABDOMINAL PAIN: 0
VOMITING: 0
NAUSEA: 0
COUGH: 0
TROUBLE SWALLOWING: 0
SHORTNESS OF BREATH: 0
BACK PAIN: 1
COLOR CHANGE: 0
DIARRHEA: 0

## 2022-12-01 NOTE — PATIENT INSTRUCTIONS
You have hypermobility syndrome     I see no obvious signs of underlying systemic autoimmune disease but will need further workup

## 2022-12-01 NOTE — PROGRESS NOTES
Sole Talbert 1990 is a 34 y.o. female, here for evaluation of the following chief complaint(s):  New Patient (Patient here as a new patient muscle cramps, joint pains/inflammation.)         ASSESSMENT/PLAN:    Sole Talbert 1990 is a 34 y.o. female seen in consult for polyarthritis. 1.  Polyarthritis-I see no signs of synovitis on exam to suggest an underlying inflammatory arthritis. She meets 8 out of 9 Beighton criteria for hypermobility syndrome. I suspect this is the source of her joint issues but will need further work-up as below. 2.  Hypermobility syndrome-treatment is symptomatic with Tylenol and over-the-counter NSAIDs as needed and trying to avoid overextending the joints. She does not have excessively stretchy skin. I think EDS is less likely but could consider genetics evaluation. If work-up below is unrevealing she can follow-up with me on a as needed basis. If I see anything concerning on work-up below I will have her back for follow-up as appropriate. 1. Polyarthritis  -     C-Reactive Protein; Future  -     Sedimentation Rate; Future  -     Rheumatoid Factor; Future  -     Cyclic Citrul Peptide Antibody, IgG; Future  -     Miscellaneous Sendout; Future  -     Hepatitis C Antibody; Future  -     Lyme Disease Acute Reflexive Panel; Future  -     XR HAND LEFT (MIN 3 VIEWS); Future  -     XR FOOT LEFT (MIN 3 VIEWS); Future  -     XR FOOT RIGHT (MIN 3 VIEWS); Future  -     XR HAND RIGHT (MIN 3 VIEWS); Future  2. Benign hypermobility syndrome      Return if symptoms worsen or fail to improve. Subjective   SUBJECTIVE/OBJECTIVE:    HPI: Sole Talbert 1990 is a 34 y.o. female seen in consult for polyarthritis. Patient states she has longstanding diffuse joint pain. She states that she has issues with multiple joints including the knees, hips left greater than right. She has issues with sciatica. She has some hand pain and cramping at times.   She will get leg and foot cramps also. She states that she has had swelling in the right knee. Physical therapy has helped. She takes Tylenol occasionally which helps to some degree. Heating pad also helps. Muscle relaxers have helped in the past.  She is now using medical marijuana which does help. She has a history of hidradenitis suppurativa and had history of excision on the left side. She is very hypermobile and has dislocated fingers the past.  She has no extra-articular manifestations. Previous work-up has shown negative JUVENCIO, negative rheumatoid factor and CCP. Past Medical History:   Diagnosis Date    ADHD     Benign intracranial hypertension     Depression     Hidradenitis     Hyperlipidemia     Sciatica of left side         Review of Systems   Constitutional:  Negative for fatigue and fever. HENT:  Negative for mouth sores and trouble swallowing. Respiratory:  Negative for cough and shortness of breath. Cardiovascular:  Negative for chest pain. Gastrointestinal:  Negative for abdominal pain, diarrhea, nausea and vomiting. Genitourinary:  Negative for dysuria and hematuria. Musculoskeletal:  Positive for arthralgias, back pain, joint swelling and myalgias. Skin:  Negative for color change and rash. Neurological:  Negative for weakness and numbness. Hematological:  Negative for adenopathy. All other systems reviewed and are negative. Objective   Vitals:    12/01/22 1506   BP: 122/70   Pulse: 83   Resp: 18   SpO2: 95%      Physical Exam  Constitutional:       General: She is not in acute distress. Appearance: Normal appearance. HENT:      Head: Normocephalic and atraumatic. Right Ear: External ear normal.      Left Ear: External ear normal.      Nose: Nose normal.   Eyes:      General: No scleral icterus. Pulmonary:      Effort: Pulmonary effort is normal.   Musculoskeletal:         General: No swelling, tenderness or deformity. Comments:  There is no synovitis on exam.  However she meets 8 out of 9 Beighton criteria. Skin:     General: Skin is warm and dry. Findings: No rash. Neurological:      General: No focal deficit present. Mental Status: She is alert and oriented to person, place, and time. Mental status is at baseline. Psychiatric:         Mood and Affect: Mood normal.         Behavior: Behavior normal.          Lab Results   Component Value Date    WBC 11.8 (H) 07/12/2022    HGB 13.9 07/12/2022    HCT 44.2 07/12/2022    MCV 96.5 07/12/2022     (H) 07/12/2022     Lab Results   Component Value Date     11/07/2022    K 3.8 11/07/2022     11/07/2022    CO2 24 11/07/2022    BUN 13 11/07/2022    CREATININE 0.6 11/07/2022    GLUCOSE 91 11/07/2022    CALCIUM 8.8 11/07/2022    PROT 8.2 07/12/2022    LABALBU 4.4 07/12/2022    BILITOT 0.5 07/12/2022    ALKPHOS 86 07/12/2022    AST 26 07/12/2022    ALT 15 07/12/2022    LABGLOM >60 11/07/2022    GFRAA >60 07/12/2022     Lab Results   Component Value Date    JUVENCIO NEGATIVE 07/12/2022     No results found for: RHEUMFACTOR  Lab Results   Component Value Date    SEDRATE 22 (H) 07/12/2022     Lab Results   Component Value Date    CRP 1.0 (H) 07/12/2022            An electronic signature was used to authenticate this note. This note was generated with a voice recognition dictation system. Please disregard any errors or omission which have escaped my review.     --Atiya Scott, DO

## 2022-12-02 LAB
HEPATITIS C ANTIBODY INTERPRETATION: NORMAL
SEDIMENTATION RATE, ERYTHROCYTE: 24 MM/HR (ref 0–20)

## 2022-12-04 LAB — CYCLIC CITRULLINATED PEPTIDE ANTIBODY IGG: 2.3 U/ML (ref 0–7)

## 2022-12-05 ENCOUNTER — HOSPITAL ENCOUNTER (OUTPATIENT)
Age: 32
Discharge: HOME | End: 2022-12-05
Payer: COMMERCIAL

## 2022-12-05 ENCOUNTER — HOSPITAL ENCOUNTER (OUTPATIENT)
Dept: GENERAL RADIOLOGY | Age: 32
Discharge: HOME | End: 2022-12-07
Payer: COMMERCIAL

## 2022-12-05 ENCOUNTER — HOSPITAL ENCOUNTER (OUTPATIENT)
Age: 32
Discharge: HOME | End: 2022-12-07
Payer: COMMERCIAL

## 2022-12-05 DIAGNOSIS — M13.0 POLYARTHRITIS: ICD-10-CM

## 2022-12-05 DIAGNOSIS — L68.0 HIRSUTISM: ICD-10-CM

## 2022-12-05 DIAGNOSIS — R73.03 PREDIABETES: ICD-10-CM

## 2022-12-05 LAB
ALBUMIN SERPL-MCNC: 4 G/DL (ref 3.5–5.2)
ALP BLD-CCNC: 76 U/L (ref 35–104)
ALT SERPL-CCNC: 15 U/L (ref 0–32)
ANION GAP SERPL CALCULATED.3IONS-SCNC: 11 MMOL/L (ref 7–16)
AST SERPL-CCNC: 14 U/L (ref 0–31)
BILIRUB SERPL-MCNC: 0.3 MG/DL (ref 0–1.2)
BUN BLDV-MCNC: 12 MG/DL (ref 6–20)
CALCIUM SERPL-MCNC: 8.7 MG/DL (ref 8.6–10.2)
CHLORIDE BLD-SCNC: 105 MMOL/L (ref 98–107)
CO2: 23 MMOL/L (ref 22–29)
CREAT SERPL-MCNC: 0.7 MG/DL (ref 0.5–1)
GFR SERPL CREATININE-BSD FRML MDRD: >60 ML/MIN/1.73
GLUCOSE BLD-MCNC: 126 MG/DL (ref 74–99)
HBA1C MFR BLD: 5.8 % (ref 4–5.6)
HCT VFR BLD CALC: 41.9 % (ref 34–48)
HEMOGLOBIN: 12.9 G/DL (ref 11.5–15.5)
LYME (B. BURGDORFERI) AB IGG WB: NEGATIVE
LYME AB IGM BY WB:: NEGATIVE
LYME, EIA: 1.21 LIV (ref 0–1.2)
MCH RBC QN AUTO: 30.1 PG (ref 26–35)
MCHC RBC AUTO-ENTMCNC: 30.8 % (ref 32–34.5)
MCV RBC AUTO: 97.7 FL (ref 80–99.9)
PDW BLD-RTO: 14.4 FL (ref 11.5–15)
PLATELET # BLD: 474 E9/L (ref 130–450)
PMV BLD AUTO: 9.6 FL (ref 7–12)
POTASSIUM SERPL-SCNC: 3.5 MMOL/L (ref 3.5–5)
RBC # BLD: 4.29 E12/L (ref 3.5–5.5)
SODIUM BLD-SCNC: 139 MMOL/L (ref 132–146)
T4 FREE: 1.17 NG/DL (ref 0.93–1.7)
TOTAL PROTEIN: 7 G/DL (ref 6.4–8.3)
TSH SERPL DL<=0.05 MIU/L-ACNC: 1.1 UIU/ML (ref 0.27–4.2)
VITAMIN D 25-HYDROXY: 16 NG/ML (ref 30–100)
WBC # BLD: 13.2 E9/L (ref 4.5–11.5)

## 2022-12-05 PROCEDURE — 73130 X-RAY EXAM OF HAND: CPT

## 2022-12-05 PROCEDURE — 82306 VITAMIN D 25 HYDROXY: CPT

## 2022-12-05 PROCEDURE — 84439 ASSAY OF FREE THYROXINE: CPT

## 2022-12-05 PROCEDURE — 73630 X-RAY EXAM OF FOOT: CPT

## 2022-12-05 PROCEDURE — 83036 HEMOGLOBIN GLYCOSYLATED A1C: CPT

## 2022-12-05 PROCEDURE — 84443 ASSAY THYROID STIM HORMONE: CPT

## 2022-12-05 PROCEDURE — 36415 COLL VENOUS BLD VENIPUNCTURE: CPT

## 2022-12-05 PROCEDURE — 85027 COMPLETE CBC AUTOMATED: CPT

## 2022-12-05 PROCEDURE — 84403 ASSAY OF TOTAL TESTOSTERONE: CPT

## 2022-12-05 PROCEDURE — 80053 COMPREHEN METABOLIC PANEL: CPT

## 2022-12-05 PROCEDURE — 84270 ASSAY OF SEX HORMONE GLOBUL: CPT

## 2022-12-06 LAB
SEX HORMONE BINDING GLOBULIN: 31 NMOL/L (ref 30–135)
TESTOSTERONE FREE-NONMALE: 5.8 PG/ML (ref 1.3–9.2)
TESTOSTERONE TOTAL: 31 NG/DL (ref 20–70)

## 2022-12-19 LAB
Lab: NORMAL
REPORT: NORMAL
THIS TEST SENT TO: NORMAL

## 2022-12-20 ENCOUNTER — OFFICE VISIT (OUTPATIENT)
Dept: FAMILY MEDICINE CLINIC | Age: 32
End: 2022-12-20
Payer: COMMERCIAL

## 2022-12-20 VITALS
HEIGHT: 63 IN | HEART RATE: 80 BPM | DIASTOLIC BLOOD PRESSURE: 74 MMHG | BODY MASS INDEX: 41.46 KG/M2 | TEMPERATURE: 97.1 F | OXYGEN SATURATION: 97 % | RESPIRATION RATE: 17 BRPM | SYSTOLIC BLOOD PRESSURE: 110 MMHG | WEIGHT: 234 LBS

## 2022-12-20 DIAGNOSIS — Z23 FLU VACCINE NEED: ICD-10-CM

## 2022-12-20 DIAGNOSIS — R59.0 ENLARGED LYMPH NODE IN NECK: ICD-10-CM

## 2022-12-20 DIAGNOSIS — R73.03 PREDIABETES: Primary | ICD-10-CM

## 2022-12-20 DIAGNOSIS — E55.9 VITAMIN D DEFICIENCY: ICD-10-CM

## 2022-12-20 PROCEDURE — G8482 FLU IMMUNIZE ORDER/ADMIN: HCPCS | Performed by: FAMILY MEDICINE

## 2022-12-20 PROCEDURE — 1036F TOBACCO NON-USER: CPT | Performed by: FAMILY MEDICINE

## 2022-12-20 PROCEDURE — G8417 CALC BMI ABV UP PARAM F/U: HCPCS | Performed by: FAMILY MEDICINE

## 2022-12-20 PROCEDURE — 90674 CCIIV4 VAC NO PRSV 0.5 ML IM: CPT | Performed by: FAMILY MEDICINE

## 2022-12-20 PROCEDURE — 99214 OFFICE O/P EST MOD 30 MIN: CPT | Performed by: FAMILY MEDICINE

## 2022-12-20 PROCEDURE — G8427 DOCREV CUR MEDS BY ELIG CLIN: HCPCS | Performed by: FAMILY MEDICINE

## 2022-12-20 NOTE — PROGRESS NOTES
[facility])  Family Medicine Outpatient        SUBJECTIVE:  CC: had concerns including Follow-up (Pt here for a 5 week follow up after having a viral illness. ). HPI:  Samara Neri is a female 34 y.o. presented to the clinic to follow up on recent lab work. She reports doing well. She denies any acute concerns. Review of Systems   Constitutional:  Negative for appetite change, fatigue and fever. Respiratory:  Negative for cough, shortness of breath and wheezing. Cardiovascular:  Negative for chest pain and palpitations. Gastrointestinal:  Negative for abdominal pain, constipation, diarrhea, nausea and vomiting. Outpatient Medications Marked as Taking for the 12/20/22 encounter (Office Visit) with Roma Kessler MD   Medication Sig Dispense Refill    ipratropium (ATROVENT) 0.03 % nasal spray 2 sprays by Each Nostril route in the morning and 2 sprays in the evening. Do all this for 7 days. 1 each 0    acetaZOLAMIDE (DIAMOX) 250 MG tablet Take 1 tablet by mouth 2 times daily 90 tablet 3    vitamin D (ERGOCALCIFEROL) 1.25 MG (16054 UT) CAPS capsule TAKE 1 CAPSULE BY MOUTH ONE TIME PER WEEK 4 capsule 2       I have reviewed all pertinent PMHx, PSHx, FamHx, SocialHx, medications, and allergies and updated history as appropriate. OBJECTIVE    VS: /74   Pulse 80   Temp 97.1 °F (36.2 °C) (Temporal)   Resp 17   Ht 5' 3\" (1.6 m)   Wt 234 lb (106.1 kg)   LMP 12/19/2022 (Exact Date)   SpO2 97%   Breastfeeding No   BMI 41.45 kg/m²   Physical Exam  Constitutional:       General: She is not in acute distress. Appearance: She is well-developed. She is not diaphoretic. HENT:      Head: Normocephalic and atraumatic. Right Ear: Tympanic membrane normal.      Left Ear: Tympanic membrane normal.      Mouth/Throat:      Mouth: Mucous membranes are moist.      Pharynx: No oropharyngeal exudate or posterior oropharyngeal erythema.    Eyes:      Conjunctiva/sclera: Conjunctivae normal. Pupils: Pupils are equal, round, and reactive to light. Cardiovascular:      Rate and Rhythm: Normal rate and regular rhythm. Pulmonary:      Effort: Pulmonary effort is normal.      Breath sounds: Normal breath sounds. Abdominal:      General: Bowel sounds are normal. There is no distension. Palpations: Abdomen is soft. Tenderness: There is no abdominal tenderness. Hernia: No hernia is present. Musculoskeletal:      Cervical back: Normal range of motion and neck supple. Lymphadenopathy:      Cervical: Cervical adenopathy (left) present. Skin:     General: Skin is warm and dry. Neurological:      Mental Status: She is alert and oriented to person, place, and time. ASSESSMENT/PLAN:  1. Prediabetes  5.8% a1c on recent labs. Continue to monitor. Watch carbohydrates and work on weight reduction with caloric restriction and exercise 150 min/week as tolerated. 2. Vitamin D deficiency  Continue high dose vitamin d at this time. Once complete recommend 1,000u qd otc vit d.     3. Flu vaccine need  - Influenza, FLUCELVAX, (age 10 mo+), IM, Preservative Free, 0.5 mL    4. Enlarged lymph node in neck  Since July Left submental lymph node enlargement. Reportedly treated at [facility] for infection and presented as a new patient at that time on antibiotics. Adenopathy per patient has persisted since and patient feels like it is larger. CT neck was done and showed a nonspecific enlargement in left sided submental ln. [place] and HIV screen negative. Baseline labs utd. Secondary to persistence. Consultation placed for heme/onc. - Kirill Michelle MD, Medical Oncology, [place]    I have reviewed my findings and recommendations with Citlaly Lino MD  12/28/2022 9:57 AM  Return in about 4 months (around 4/20/2023). Counseled regarding above diagnosis, including possible risks and complications, especially if left uncontrolled.     Patient counseled on red flag symptoms and if they occur to go to the ED. Discussed medications risk/benefits and possible side effects and alternatives to treatment. Patient and/or guardian verbalizes understanding, agrees, feels comfortable with and wishes to proceed with above treatment plan. Advised patient regarding importance of keeping up with recommended health maintenance and to schedule as soon as possible if overdue, as this is important in assessing for undiagnosed pathology, especially cancer, as well as protecting against potentially harmful/life threatening disease. Patient and/or guardian verbalizes understanding and agrees with above counseling, assessment and plan. All questions answered. Please note this report has been partially produced using speech recognition software  and may contain errors related to that system including grammar, punctuation and spelling as well as words and phrases that may seem inappropriate. If there are questions or concerns please feel free to contact me to clarify.

## 2022-12-28 ASSESSMENT — ENCOUNTER SYMPTOMS
WHEEZING: 0
CONSTIPATION: 0
VOMITING: 0
NAUSEA: 0
ABDOMINAL PAIN: 0
DIARRHEA: 0
COUGH: 0
SHORTNESS OF BREATH: 0

## 2023-01-06 ENCOUNTER — HOSPITAL ENCOUNTER (OUTPATIENT)
Dept: INFUSION THERAPY | Age: 33
Discharge: HOME | End: 2023-01-06
Payer: COMMERCIAL

## 2023-01-06 ENCOUNTER — OFFICE VISIT (OUTPATIENT)
Dept: ONCOLOGY | Age: 33
End: 2023-01-06

## 2023-01-06 VITALS
WEIGHT: 239.7 LBS | DIASTOLIC BLOOD PRESSURE: 72 MMHG | BODY MASS INDEX: 42.47 KG/M2 | HEART RATE: 59 BPM | TEMPERATURE: 96.9 F | OXYGEN SATURATION: 100 % | HEIGHT: 63 IN | SYSTOLIC BLOOD PRESSURE: 111 MMHG

## 2023-01-06 DIAGNOSIS — D75.839 THROMBOCYTOSIS: ICD-10-CM

## 2023-01-06 DIAGNOSIS — D72.10 EOSINOPHILIA, UNSPECIFIED TYPE: Primary | ICD-10-CM

## 2023-01-06 DIAGNOSIS — D72.10 EOSINOPHILIA, UNSPECIFIED TYPE: ICD-10-CM

## 2023-01-06 PROBLEM — E11.9 DIABETES MELLITUS (HCC): Status: ACTIVE | Noted: 2023-01-06

## 2023-01-06 LAB
ALBUMIN SERPL-MCNC: 4.4 G/DL (ref 3.5–5.2)
ALP BLD-CCNC: 88 U/L (ref 35–104)
ALT SERPL-CCNC: 12 U/L (ref 0–32)
ANION GAP SERPL CALCULATED.3IONS-SCNC: 13 MMOL/L (ref 7–16)
AST SERPL-CCNC: 14 U/L (ref 0–31)
BACTERIA: ABNORMAL /HPF
BASOPHILS ABSOLUTE: 0.05 E9/L (ref 0–0.2)
BASOPHILS RELATIVE PERCENT: 0.3 % (ref 0–2)
BILIRUB SERPL-MCNC: 0.2 MG/DL (ref 0–1.2)
BILIRUBIN URINE: NEGATIVE
BLOOD, URINE: NEGATIVE
BUN BLDV-MCNC: 12 MG/DL (ref 6–20)
CALCIUM SERPL-MCNC: 8.9 MG/DL (ref 8.6–10.2)
CHLORIDE BLD-SCNC: 106 MMOL/L (ref 98–107)
CLARITY: ABNORMAL
CO2: 18 MMOL/L (ref 22–29)
COLOR: YELLOW
CREAT SERPL-MCNC: 0.7 MG/DL (ref 0.5–1)
EOSINOPHILS ABSOLUTE: 1.39 E9/L (ref 0.05–0.5)
EOSINOPHILS RELATIVE PERCENT: 9.7 % (ref 0–6)
FERRITIN: 28 NG/ML
GFR SERPL CREATININE-BSD FRML MDRD: >60 ML/MIN/1.73
GLUCOSE BLD-MCNC: 82 MG/DL (ref 74–99)
GLUCOSE URINE: NEGATIVE MG/DL
HCT VFR BLD CALC: 41.7 % (ref 34–48)
HEMOGLOBIN: 13.2 G/DL (ref 11.5–15.5)
IMMATURE GRANULOCYTES #: 0.04 E9/L
IMMATURE GRANULOCYTES %: 0.3 % (ref 0–5)
IRON SATURATION: 12 % (ref 15–50)
IRON: 44 MCG/DL (ref 37–145)
KETONES, URINE: NEGATIVE MG/DL
LACTATE DEHYDROGENASE: 164 U/L (ref 135–214)
LEUKOCYTE ESTERASE, URINE: NEGATIVE
LYMPHOCYTES ABSOLUTE: 3.09 E9/L (ref 1.5–4)
LYMPHOCYTES RELATIVE PERCENT: 21.5 % (ref 20–42)
Lab: NORMAL
MCH RBC QN AUTO: 29.9 PG (ref 26–35)
MCHC RBC AUTO-ENTMCNC: 31.7 % (ref 32–34.5)
MCV RBC AUTO: 94.3 FL (ref 80–99.9)
MONOCYTES ABSOLUTE: 0.57 E9/L (ref 0.1–0.95)
MONOCYTES RELATIVE PERCENT: 4 % (ref 2–12)
NEUTROPHILS ABSOLUTE: 9.22 E9/L (ref 1.8–7.3)
NEUTROPHILS RELATIVE PERCENT: 64.2 % (ref 43–80)
NITRITE, URINE: NEGATIVE
PDW BLD-RTO: 14.3 FL (ref 11.5–15)
PH UA: 8 (ref 5–9)
PLATELET # BLD: 509 E9/L (ref 130–450)
PMV BLD AUTO: 9.8 FL (ref 7–12)
POTASSIUM SERPL-SCNC: 4 MMOL/L (ref 3.5–5)
PROTEIN UA: NEGATIVE MG/DL
RBC # BLD: 4.42 E12/L (ref 3.5–5.5)
RBC UA: ABNORMAL /HPF (ref 0–2)
SODIUM BLD-SCNC: 137 MMOL/L (ref 132–146)
SPECIFIC GRAVITY UA: 1.02 (ref 1–1.03)
THIS TEST SENT TO: NORMAL
TOTAL IRON BINDING CAPACITY: 360 MCG/DL (ref 250–450)
TOTAL PROTEIN: 7.6 G/DL (ref 6.4–8.3)
UROBILINOGEN, URINE: 0.2 E.U./DL
WBC # BLD: 14.4 E9/L (ref 4.5–11.5)
WBC UA: ABNORMAL /HPF (ref 0–5)

## 2023-01-06 PROCEDURE — 82785 ASSAY OF IGE: CPT

## 2023-01-06 PROCEDURE — 81001 URINALYSIS AUTO W/SCOPE: CPT

## 2023-01-06 PROCEDURE — 85025 COMPLETE CBC W/AUTO DIFF WBC: CPT

## 2023-01-06 PROCEDURE — 36415 COLL VENOUS BLD VENIPUNCTURE: CPT

## 2023-01-06 PROCEDURE — 83615 LACTATE (LD) (LDH) ENZYME: CPT

## 2023-01-06 PROCEDURE — 81270 JAK2 GENE: CPT

## 2023-01-06 PROCEDURE — 82784 ASSAY IGA/IGD/IGG/IGM EACH: CPT

## 2023-01-06 PROCEDURE — 80053 COMPREHEN METABOLIC PANEL: CPT

## 2023-01-06 PROCEDURE — 83540 ASSAY OF IRON: CPT

## 2023-01-06 PROCEDURE — 82728 ASSAY OF FERRITIN: CPT

## 2023-01-06 PROCEDURE — 83550 IRON BINDING TEST: CPT

## 2023-01-06 PROCEDURE — 82787 IGG 1 2 3 OR 4 EACH: CPT

## 2023-01-06 ASSESSMENT — ENCOUNTER SYMPTOMS
SINUS PAIN: 0
GASTROINTESTINAL NEGATIVE: 1
SINUS PRESSURE: 0
SHORTNESS OF BREATH: 0
COUGH: 0

## 2023-01-06 NOTE — PROGRESS NOTES
Taylor Pisano  1990 34 y.o. Referring Physician:    PCP: Sole Morfin MD    Vitals:    23 1123   BP: 111/72   Pulse: 59   Temp: 96.9 °F (36.1 °C)   SpO2: 100%        Wt Readings from Last 3 Encounters:   23 239 lb 11.2 oz (108.7 kg)   22 234 lb (106.1 kg)   22 232 lb (105.2 kg)        Body mass index is 42.46 kg/m². Chief Complaint:   Chief Complaint   Patient presents with    New Patient         Cancer Staging  No matching staging information was found for the patient. Prior Radiation Therapy? NO    Concurrent Chemo/radiation? NO    Prior Chemotherapy? NO    Prior Hormonal Therapy? NO    Head and Neck Cancer? No, patient does NOT have HN cancer. LMP: 21    Age at first Menses: 14    : 2    Para: 0          Current Outpatient Medications:     acetaZOLAMIDE (DIAMOX) 250 MG tablet, Take 1 tablet by mouth 2 times daily, Disp: 90 tablet, Rfl: 3    vitamin D (ERGOCALCIFEROL) 1.25 MG (43783 UT) CAPS capsule, TAKE 1 CAPSULE BY MOUTH ONE TIME PER WEEK, Disp: 4 capsule, Rfl: 2    ipratropium (ATROVENT) 0.03 % nasal spray, 2 sprays by Each Nostril route in the morning and 2 sprays in the evening. Do all this for 7 days. , Disp: 1 each, Rfl: 0       Past Medical History:   Diagnosis Date    ADHD     Anxiety     Benign intracranial hypertension     Bipolar 1 disorder (HCC)     Depression     Diabetes mellitus ([facility] [address]     pre diabetes    Hidradenitis     Hyperlipidemia     PTSD (post-traumatic stress disorder)     Sciatica of left side        Past Surgical History:   Procedure Laterality Date    OTHER SURGICAL HISTORY      EXCISION OF LEFT AXILLARY HIDRADENITIS    TONSILLECTOMY  2008    WISDOM TOOTH EXTRACTION         Family History   Problem Relation Age of Onset    Heart Disease Mother     High Blood Pressure Mother     Osteoarthritis Mother     High Blood Pressure Father     Kidney Disease Father     Cancer Maternal Aunt     Cancer Maternal Uncle colon    Cancer Paternal Aunt     Cancer Paternal Uncle         colon    Heart Attack Maternal Grandmother     No Known Problems Maternal Grandfather     Heart Attack Paternal Grandmother     Brain Cancer Paternal Grandfather        Social History     Socioeconomic History    Marital status: Single     Spouse name: Not on file    Number of children: Not on file    Years of education: Not on file    Highest education level: Not on file   Occupational History     Employer: NOT EMPLOYED   Tobacco Use    Smoking status: Former     Types: Cigarettes     Quit date: 2015     Years since quittin.1    Smokeless tobacco: Never    Tobacco comments:     occasional; quit smoking in    Vaping Use    Vaping Use: Never used   Substance and Sexual Activity    Alcohol use: Yes     Comment: Social    Drug use: Yes     Types: Marijuana Katherin Luis)     Comment: medical--- 2-3 times per day    Sexual activity: Yes     Partners: Male   Other Topics Concern    Not on file   Social History Narrative    Not on file     Social Determinants of Health     Financial Resource Strain: Low Risk     Difficulty of Paying Living Expenses: Not hard at all   Food Insecurity: No Food Insecurity    Worried About Running Out of Food in the Last Year: Never true    Ran Out of Food in the Last Year: Never true   Transportation Needs: Not on file   Physical Activity: Not on file   Stress: Not on file   Social Connections: Not on file   Intimate Partner Violence: Not on file   Housing Stability: Not on file           Occupation: unemployed  Retired:  NO          REVIEW OF SYSTEMS:     Pacemaker/Defibulator/ICD:  No    Mediport: No           FALLS RISK SCREENING ASSESSMENT    Instructions:  Assess the patient and [place] the appropriate indicators that are present for fall risk identification. Total the numbers circled and assign a fall risk score from Table 2.  Reassess patient at a minimum every 12 weeks or with status change.     Assessment Date  1/6/2023     1. Mental Ability: confusion/cognitively impaired No - 0       2. Elimination Issues: incontinence, frequency No - 0       3. Ambulatory: use of assistive devices (walker, cane, off-loading devices), attached to equipment (IV pole, oxygen) No - 0     4. Sensory Limitations: dizziness, vertigo, impaired vision Yes - 3       5. Age Less than 65 years - 0       6. Medication: diuretics, strong analgesics, hypnotics, sedatives, antihypertensive agents   No 0   7. Falls:  recent history of falls within the last 3 months (not to include slipping or tripping)   No - 0   TOTAL 3    If score of 4 or greater was education given? No       TABLE 2   Risk Score Risk Level Plan of Care   0-3 Little or  No Risk 1. Provide assistance as indicated for ambulation activities  2. Reorient confused/cognitively impaired patient  3. Call-light/bell within patient's reach  4. Chair/bed in low position, stretcher/bed with siderails up except when performing patient care activities  5. Educate patient/family/caregiver on falls prevention  6.  Reassess in 12 weeks or with any noted change in patient condition which places them at a risk for a fall   4-6 Moderate Risk 1. Provide assistance as indicated for ambulation activities  2. Reorient confused/cognitively impaired patient  3. Call-light/bell within patient's reach  4. Chair/bed in low position, stretcher/bed with siderails up except when performing patient care activities  5. Educate patient/family/caregiver on falls prevention  6. Falls risk precaution (Yellow sticker Level II) placed on patient chart   7 or   Higher High Risk 1. Place patient in easily observable treatment room  2. Patient attended at all times by family member or staff  3. Provide assistance as indicated for ambulation activities  4. Reorient confused/cognitively impaired patient  5. Call-light/bell within patient's reach  6.   Chair/bed in low position, stretcher/bed with siderails up except when performing patient care activities  7. Educate patient/family/caregiver on falls prevention  8. Falls risk precaution (Yellow sticker Level III) placed on patient chart           MALNUTRITION RISK SCREENING ASSESSMENT    Instructions:  Assess the patient and enter the appropriate indicators that are present for nutrition risk identification. Total the numbers entered and assign a risk score. Follow the appropriate action for total score listed below. Assessment   Date  1/6/2023     Have you lost weight without trying? 0- No     Have you been eating poorly because of a decreased appetite? 0- No   3. Do you have a diagnosis of head and neck cancer? 0- No                                                                                    TOTAL 0        Score of 0-1: No action  Score 2 or greater:   For Non-Diabetic Patient: Recommend adding Ensure Enlive 2 x daily and provide patient with Ensure wellness bag with coupons  For Diabetic Patient: Recommend adding Glucerna Shake 2 x daily and provide patient with Glucerna Wellness bag with coupons  Route to the dietitian via Maren Díaz RN

## 2023-01-06 NOTE — PROGRESS NOTES
[id number] [facility]  [place] [address]  Dept: Rom[place]: [phone]  Clinic Consultation Note    Referring Provider:  Gretel Dale MD    Reason for Visit:   Enlarged lymph node in neck    PCP:  Gretel Dale MD    Demographics: 34 y.o. female    Chief Complaint:   Chief Complaint   Patient presents with    New Patient       History of Present Illness (1/6/2023): The patient is a 34 y.o. female comes in after referral from PCP regarding enlarged cervical lymph node. Patient states that she has had symptoms at least for a year, in which this left submental lymph node would fluctuate in size on 3 different occasions. She denies of fevers, chills, night sweats, but states sensation of feeling warm/hot at night, requiring using fans. Per chart review she has had fluctuations in her weight, but largely stable, without a gradual decline. She did have a recent CT soft tissue neck done, suggesting benign appearance of this lymph node. She denies of any other lymph nodes elsewhere. I have also reviewed that she has had some eosinophilia since 2018. She denies of distant/international travel, exposure to recent infections, significant allergies apart from grass pollen (hives). She does have a dog at home, and states it is up-to-date with vaccinations. She denies being on herbal supplementation. She does use medical marijuana since 2017. She denies of significant headaches. She denies of any bleeding, heavy menstrual cycles, nausea, vomiting, fevers or chills. She does have other issues such as pseudotumor cerebri/benign intracranial hypertension, and some musculoskeletal issues. I reviewed she has seen rheumatology, with unremarkable work-up. She admits feeling more fatigued of late. Review of Systems; Review of Systems   Constitutional:  Positive for fatigue.  Negative for fever and unexpected weight change. HENT:  Negative for sinus pressure and sinus pain. Eyes:  Negative for visual disturbance. Respiratory:  Negative for cough and shortness of breath. Cardiovascular:  Negative for chest pain and palpitations. Gastrointestinal: Negative. Genitourinary:  Negative for menstrual problem. Musculoskeletal: Negative. Skin: Negative. Neurological: Negative. Hematological:  Positive for adenopathy. Does not bruise/bleed easily. Psychiatric/Behavioral: Negative. Past Medical History:      Diagnosis Date    ADHD     Anxiety     Benign intracranial hypertension     Bipolar 1 disorder (HCC)     Depression     Diabetes mellitus (HCC)     pre diabetes    Hidradenitis     Hyperlipidemia     PTSD (post-traumatic stress disorder)     Sciatica of left side      Patient Active Problem List   Diagnosis    Depression    History of ADHD    Viral URI with cough    Fatigue    Elevated blood pressure reading    Morbid obesity (HCC)    Sprained ankle    Osteoarthritis of multiple joints    Muscle spasm    Hidradenitis suppurativa    Thrombocytosis    Hidradenitis    Benign hypermobility syndrome    Diabetes mellitus ([facility] [address]        Past Surgical History:      Procedure Laterality Date    OTHER SURGICAL HISTORY      EXCISION OF LEFT AXILLARY HIDRADENITIS    TONSILLECTOMY  2008    WISDOM TOOTH EXTRACTION         Family History:  Family History   Problem Relation Age of Onset    Heart Disease Mother     High Blood Pressure Mother     Osteoarthritis Mother     High Blood Pressure Father     Kidney Disease Father     Cancer Maternal Aunt     Cancer Maternal Uncle         colon    Cancer Paternal Aunt     Cancer Paternal Uncle         colon    Heart Attack Maternal Grandmother     No Known Problems Maternal Grandfather     Heart Attack Paternal Grandmother     Brain Cancer Paternal Grandfather        Medications:  Reviewed and reconciled.   Current Outpatient Medications   Medication Sig Dispense Refill acetaZOLAMIDE (DIAMOX) 250 MG tablet Take 1 tablet by mouth 2 times daily 90 tablet 3    vitamin D (ERGOCALCIFEROL) 1.25 MG (10028 UT) CAPS capsule TAKE 1 CAPSULE BY MOUTH ONE TIME PER WEEK 4 capsule 2    ipratropium (ATROVENT) 0.03 % nasal spray 2 sprays by Each Nostril route in the morning and 2 sprays in the evening. Do all this for 7 days. 1 each 0     No current facility-administered medications for this visit. Social History:  Social History     Socioeconomic History    Marital status: Single     Spouse name: Not on file    Number of children: Not on file    Years of education: Not on file    Highest education level: Not on file   Occupational History     Employer: NOT EMPLOYED   Tobacco Use    Smoking status: Former     Types: Cigarettes     Quit date: 2015     Years since quittin.1    Smokeless tobacco: Never    Tobacco comments:     occasional; quit smoking in    Vaping Use    Vaping Use: Never used   Substance and Sexual Activity    Alcohol use: Yes     Comment: Social    Drug use: Yes     Types: Marijuana Dio Richard)     Comment: medical--- 2-3 times per day    Sexual activity: Yes     Partners: Male   Other Topics Concern    Not on file   Social History Narrative    Not on file     Social Determinants of Health     Financial Resource Strain: Low Risk     Difficulty of Paying Living Expenses: Not hard at all   Food Insecurity: No Food Insecurity    Worried About Running Out of Food in the Last Year: Never true    Ran Out of Food in the Last Year: Never true   Transportation Needs: Not on file   Physical Activity: Not on file   Stress: Not on file   Social Connections: Not on file   Intimate Partner Violence: Not on file   Housing Stability: Not on file       Allergies:   Allergies   Allergen Reactions    Grass Pollen(K-O-R-T-Swt Sean) Hives       Physical Exam:  /72   Pulse 59   Temp 96.9 °F (36.1 °C)   Ht 5' 3\" (1.6 m)   Wt 239 lb 11.2 oz (108.7 kg)   LMP 2022 (Exact Date)   SpO2 100%   BMI 42.46 kg/m²   Physical Exam  Constitutional:       General: She is not in acute distress. Appearance: She is not toxic-appearing. HENT:      Head: Normocephalic. Nose: Nose normal.      Mouth/Throat:      Mouth: Mucous membranes are moist.      Pharynx: No oropharyngeal exudate or posterior oropharyngeal erythema. Eyes:      General: No scleral icterus. Extraocular Movements: Extraocular movements intact. Cardiovascular:      Rate and Rhythm: Normal rate and regular rhythm. Heart sounds: No murmur heard. Pulmonary:      Effort: Pulmonary effort is normal. No respiratory distress. Breath sounds: No stridor. No wheezing. Abdominal:      General: Abdomen is flat. There is no distension. Palpations: There is no mass. Tenderness: There is no abdominal tenderness. Musculoskeletal:      Cervical back: Normal range of motion. No rigidity or tenderness. Right lower leg: No edema. Left lower leg: No edema. Lymphadenopathy:      Cervical: Cervical adenopathy (Left submental lymph node, small barely palpable) present. Skin:     Findings: No lesion or rash. Neurological:      General: No focal deficit present. Mental Status: She is alert and oriented to person, place, and time. Psychiatric:         Mood and Affect: Mood normal.         Behavior: Behavior normal.         Thought Content: Thought content normal.       ECOG PS 0        ASSESSMENT:    Left submental (level 1A) adenopathy  Eosinophilia  Pseudotumor cerebri  Bipolar 1 disorder  ADHD, PTSD  Depression and anxiety  History of hidradenitis    The patient is a 34 y.o. female who established with me on 1/6/2023. She was referred by her PCP. As of date of consultation visit, she complained of a years worth of left submental lymph node enlargement, which waxes and wanes, increasing and decreasing in size on 3 different occasions.   She denies of systemic symptoms associated with this or any other adenopathy/organomegaly elsewhere. She candis on 11/14/2022, CT soft tissue neck done noting nonspecific mild enlargement of left submental (level 1A) lymph node, measuring 1.7 x 1.3 cm. Reactive etiology is favored according to the study. I have also reviewed that she has had eosinophilia since 10/1/2018. In July 2022, eosinophil count was 1.61. No overt findings on history taking suggestive of this eosinophilia. However she does smoke marijuana medical purposes due to MSK related pain. Consider this as a possible contributory factor for the eosinophilia in addition to the adenopathy. She was also found to have thrombocytosis that has been ongoing as well. We will consider possible underlying iron deficiency versus association with the above. She denies of heavy menstrual cycles or any other type of bleed. PLAN:  Will hold off on biopsy of this lymph node at this time  Patient also has eosinophilia, possibly associate with lymphadenopathy  Will check peripheral blood smear, flow cytometry  Checking JAK2 and BCR-ABL1  Checking immunoglobulins including IgE, IgA, IgM, and IgG including subtypes  Also checking UA  Furthermore if this cervical adenopathy is concerning, she has been seen by ENT in the past (for tonsillectomy in 2008) by Dr. Mattie Camarena    Will also do CT chest, abdomen, and pelvis to further assess other adenopathy elsewhere, as well as for the eosinophilia    Will also check iron studies to assess if cause of her thrombocytosis is 2/2 iron deficiency    I have also introduced role of bone marrow biopsy in case if work-up is concerning for primary hematologic pathology in light of her eosinophilia  Will hold off at this time    Also discussed her medical marijuana use which may be contributing to her adenopathy and eosinophilia. She does not appear to wish to cut down/quit at this time as she is using it for her pain issues.       DISPO:   RTC 3 weeks      Thank you for allowing us to participate in the care of Irving Osullivan       Approximately spent 61 minutes on chart review as well as time spent on patient encounter, discussing the laboratory, imaging, and clinical findings. I have discussed clinical implications and recommendations on the patient's primary issues. More than 50% of time was spent counseling patient. The patient verbalized understanding.       [address] 01/06/23 11:46 AM    [address] [place] [address]) Office  P: [phone]  F: [phone]    [facility]) Office  P: [phone]  F: [phone]

## 2023-01-07 LAB
IGA: 344 MG/DL (ref 70–400)
IGG: 1241 MG/DL (ref 700–1600)
IGM: 166 MG/DL (ref 40–230)

## 2023-01-09 LAB
IGE: 31 KU/L
IGG 1: 446 MG/DL (ref 240–1118)
IGG 2: 618 MG/DL (ref 124–549)
IGG 3: 41 MG/DL (ref 21–134)
IGG 4: 66 MG/DL (ref 1–123)
PATHOLOGIST REVIEW: NORMAL

## 2023-01-11 LAB
Lab: NORMAL
Lab: NORMAL
REPORT: NORMAL
REPORT: NORMAL
THIS TEST SENT TO: NORMAL
THIS TEST SENT TO: NORMAL

## 2023-01-16 ENCOUNTER — TELEPHONE (OUTPATIENT)
Dept: ONCOLOGY | Age: 33
End: 2023-01-16

## 2023-01-16 DIAGNOSIS — B34.9 VIRAL ILLNESS: ICD-10-CM

## 2023-01-16 RX ORDER — ACETAZOLAMIDE 250 MG/1
250 TABLET ORAL 2 TIMES DAILY
Qty: 90 TABLET | Refills: 3 | OUTPATIENT
Start: 2023-01-16

## 2023-01-16 RX ORDER — IPRATROPIUM BROMIDE 21 UG/1
2 SPRAY, METERED NASAL EVERY 12 HOURS
Qty: 1 EACH | Refills: 0 | OUTPATIENT
Start: 2023-01-16 | End: 2023-01-23

## 2023-01-16 RX ORDER — ERGOCALCIFEROL 1.25 MG/1
CAPSULE ORAL
Qty: 4 CAPSULE | Refills: 2 | Status: SHIPPED | OUTPATIENT
Start: 2023-01-16

## 2023-01-16 NOTE — TELEPHONE ENCOUNTER
SW followed up with pt re: positive distress screen. Pt denied any needs at this time. SW gave pt contact information and encouraged her to reach out should any needs arise.       Elina ENAMORADO, SUKHDEEP-S  Oncology Social Worker

## 2023-01-17 ENCOUNTER — HOSPITAL ENCOUNTER (OUTPATIENT)
Dept: CT IMAGING | Age: 33
Discharge: HOME | End: 2023-01-19
Payer: COMMERCIAL

## 2023-01-17 DIAGNOSIS — D72.10 EOSINOPHILIA, UNSPECIFIED TYPE: ICD-10-CM

## 2023-01-17 PROCEDURE — 6360000004 HC RX CONTRAST MEDICATION: Performed by: RADIOLOGY

## 2023-01-17 PROCEDURE — 71260 CT THORAX DX C+: CPT

## 2023-01-17 PROCEDURE — 74177 CT ABD & PELVIS W/CONTRAST: CPT

## 2023-01-17 RX ADMIN — IOPAMIDOL 75 ML: 755 INJECTION, SOLUTION INTRAVENOUS at 12:35

## 2023-01-20 ENCOUNTER — HOSPITAL ENCOUNTER (OUTPATIENT)
Dept: INFUSION THERAPY | Age: 33
Discharge: HOME | End: 2023-01-20
Payer: COMMERCIAL

## 2023-01-20 ENCOUNTER — OFFICE VISIT (OUTPATIENT)
Dept: ONCOLOGY | Age: 33
End: 2023-01-20
Payer: COMMERCIAL

## 2023-01-20 VITALS
TEMPERATURE: 97.2 F | HEART RATE: 70 BPM | WEIGHT: 238.8 LBS | DIASTOLIC BLOOD PRESSURE: 87 MMHG | SYSTOLIC BLOOD PRESSURE: 126 MMHG | OXYGEN SATURATION: 100 % | BODY MASS INDEX: 42.31 KG/M2 | HEIGHT: 63 IN

## 2023-01-20 DIAGNOSIS — D72.10 EOSINOPHILIA, UNSPECIFIED TYPE: Primary | ICD-10-CM

## 2023-01-20 DIAGNOSIS — D72.10 EOSINOPHILIA, UNSPECIFIED TYPE: ICD-10-CM

## 2023-01-20 LAB
BASOPHILS ABSOLUTE: 0.05 E9/L (ref 0–0.2)
BASOPHILS RELATIVE PERCENT: 0.4 % (ref 0–2)
EOSINOPHILS ABSOLUTE: 1.25 E9/L (ref 0.05–0.5)
EOSINOPHILS RELATIVE PERCENT: 9.9 % (ref 0–6)
HCT VFR BLD CALC: 41 % (ref 34–48)
HEMOGLOBIN: 12.9 G/DL (ref 11.5–15.5)
IMMATURE GRANULOCYTES #: 0.03 E9/L
IMMATURE GRANULOCYTES %: 0.2 % (ref 0–5)
LYMPHOCYTES ABSOLUTE: 3.35 E9/L (ref 1.5–4)
LYMPHOCYTES RELATIVE PERCENT: 26.4 % (ref 20–42)
MCH RBC QN AUTO: 29.7 PG (ref 26–35)
MCHC RBC AUTO-ENTMCNC: 31.5 % (ref 32–34.5)
MCV RBC AUTO: 94.3 FL (ref 80–99.9)
MONOCYTES ABSOLUTE: 0.49 E9/L (ref 0.1–0.95)
MONOCYTES RELATIVE PERCENT: 3.9 % (ref 2–12)
NEUTROPHILS ABSOLUTE: 7.52 E9/L (ref 1.8–7.3)
NEUTROPHILS RELATIVE PERCENT: 59.2 % (ref 43–80)
PDW BLD-RTO: 13.9 FL (ref 11.5–15)
PLATELET # BLD: 483 E9/L (ref 130–450)
PMV BLD AUTO: 9.5 FL (ref 7–12)
RBC # BLD: 4.35 E12/L (ref 3.5–5.5)
WBC # BLD: 12.7 E9/L (ref 4.5–11.5)

## 2023-01-20 PROCEDURE — G8482 FLU IMMUNIZE ORDER/ADMIN: HCPCS | Performed by: STUDENT IN AN ORGANIZED HEALTH CARE EDUCATION/TRAINING PROGRAM

## 2023-01-20 PROCEDURE — G8417 CALC BMI ABV UP PARAM F/U: HCPCS | Performed by: STUDENT IN AN ORGANIZED HEALTH CARE EDUCATION/TRAINING PROGRAM

## 2023-01-20 PROCEDURE — 1036F TOBACCO NON-USER: CPT | Performed by: STUDENT IN AN ORGANIZED HEALTH CARE EDUCATION/TRAINING PROGRAM

## 2023-01-20 PROCEDURE — G8427 DOCREV CUR MEDS BY ELIG CLIN: HCPCS | Performed by: STUDENT IN AN ORGANIZED HEALTH CARE EDUCATION/TRAINING PROGRAM

## 2023-01-20 PROCEDURE — 36415 COLL VENOUS BLD VENIPUNCTURE: CPT

## 2023-01-20 PROCEDURE — 99214 OFFICE O/P EST MOD 30 MIN: CPT | Performed by: STUDENT IN AN ORGANIZED HEALTH CARE EDUCATION/TRAINING PROGRAM

## 2023-01-20 ASSESSMENT — ENCOUNTER SYMPTOMS
SHORTNESS OF BREATH: 0
SINUS PRESSURE: 0
COUGH: 0
SINUS PAIN: 0
GASTROINTESTINAL NEGATIVE: 1

## 2023-01-20 NOTE — PROGRESS NOTES
[id number] [facility]  Anh Mcfarland  Dept: [phone]  Loc: [phone]  Clinic Progress Note    Referring Provider:  Shemar Mendez MD    Reason for Visit:   Enlarged lymph node in neck    PCP:  Shemar Mendez MD    Demographics: 34 y.o. female    Chief Complaint:   Chief Complaint   Patient presents with    Follow-up     Eosinophilia, unspecified type       Subjective:  No Major events since I saw patient last. She denies of new or worsening symptoms. She addressed her left cervical lymphadenopathy, its believes that it is getting little worse. HPI from Initial Outpatient Consultation (1/6/2023): The patient is a 34 y.o. female comes in after referral from PCP regarding enlarged cervical lymph node. Patient states that she has had symptoms at least for a year, in which this left submental lymph node would fluctuate in size on 3 different occasions. She denies of fevers, chills, night sweats, but states sensation of feeling warm/hot at night, requiring using fans. Per chart review she has had fluctuations in her weight, but largely stable, without a gradual decline. She did have a recent CT soft tissue neck done, suggesting benign appearance of this lymph node. She denies of any other lymph nodes elsewhere. I have also reviewed that she has had some eosinophilia since 2018. She denies of distant/international travel, exposure to recent infections, significant allergies apart from grass pollen (hives). She does have a dog at home, and states it is up-to-date with vaccinations. She denies being on herbal supplementation. She does use medical marijuana since 2017. She denies of significant headaches. She denies of any bleeding, heavy menstrual cycles, nausea, vomiting, fevers or chills.   She does have other issues such as pseudotumor cerebri/benign intracranial hypertension, and some musculoskeletal issues. I reviewed she has seen rheumatology, with unremarkable work-up. She admits feeling more fatigued of late. Review of Systems; Review of Systems   Constitutional:  Positive for fatigue. Negative for chills, diaphoresis, fever and unexpected weight change. HENT:  Negative for sinus pressure and sinus pain. Eyes:  Negative for visual disturbance. Respiratory:  Negative for cough and shortness of breath. Cardiovascular:  Negative for chest pain and palpitations. Gastrointestinal: Negative. Genitourinary:  Negative for menstrual problem. Musculoskeletal: Negative. Skin: Negative. Neurological: Negative. Hematological:  Positive for adenopathy. Does not bruise/bleed easily. Psychiatric/Behavioral: Negative.          Past Medical History:      Diagnosis Date    ADHD     Anxiety     Benign intracranial hypertension     Bipolar 1 disorder (HCC)     Depression     Diabetes mellitus (HCC)     pre diabetes    Hidradenitis     Hyperlipidemia     PTSD (post-traumatic stress disorder)     Sciatica of left side      Patient Active Problem List   Diagnosis    Depression    History of ADHD    Viral URI with cough    Fatigue    Elevated blood pressure reading    Morbid obesity (HCC)    Sprained ankle    Osteoarthritis of multiple joints    Muscle spasm    Hidradenitis suppurativa    Thrombocytosis    Hidradenitis    Benign hypermobility syndrome    Diabetes mellitus ([address]        Past Surgical History:      Procedure Laterality Date    OTHER SURGICAL HISTORY      EXCISION OF LEFT AXILLARY HIDRADENITIS    TONSILLECTOMY  2008    WISDOM TOOTH EXTRACTION         Family History:  Family History   Problem Relation Age of Onset    Heart Disease Mother     High Blood Pressure Mother     Osteoarthritis Mother     High Blood Pressure Father     Kidney Disease Father     Cancer Maternal Aunt     Cancer Maternal Uncle         colon    Cancer Paternal Aunt     Cancer Paternal Uncle         colon    Heart Attack Maternal Grandmother     No Known Problems Maternal Grandfather     Heart Attack Paternal Grandmother     Brain Cancer Paternal Grandfather        Medications:  Reviewed and reconciled. Current Outpatient Medications   Medication Sig Dispense Refill    vitamin D (ERGOCALCIFEROL) 1.25 MG (83360 UT) CAPS capsule TAKE 1 CAPSULE BY MOUTH ONE TIME PER WEEK 4 capsule 2    ipratropium (ATROVENT) 0.03 % nasal spray 2 sprays by Each Nostril route in the morning and 2 sprays in the evening. Do all this for 7 days. 1 each 0    acetaZOLAMIDE (DIAMOX) 250 MG tablet Take 1 tablet by mouth 2 times daily 90 tablet 3     No current facility-administered medications for this visit.          Social History:  Social History     Socioeconomic History    Marital status: Single     Spouse name: Not on file    Number of children: Not on file    Years of education: Not on file    Highest education level: Not on file   Occupational History     Employer: NOT EMPLOYED   Tobacco Use    Smoking status: Former     Types: Cigarettes     Quit date: 2015     Years since quittin.2    Smokeless tobacco: Never    Tobacco comments:     occasional; quit smoking in    Vaping Use    Vaping Use: Never used   Substance and Sexual Activity    Alcohol use: Yes     Comment: Social    Drug use: Yes     Types: Marijuana Yudith Ing)     Comment: medical--- 2-3 times per day    Sexual activity: Yes     Partners: Male   Other Topics Concern    Not on file   Social History Narrative    Not on file     Social Determinants of Health     Financial Resource Strain: Low Risk     Difficulty of Paying Living Expenses: Not hard at all   Food Insecurity: No Food Insecurity    Worried About Running Out of Food in the Last Year: Never true    Ran Out of Food in the Last Year: Never true   Transportation Needs: Not on file   Physical Activity: Not on file   Stress: Not on file   Social Connections: Not on file   Intimate Partner Violence: Not on file   Housing Stability: Not on file       Allergies: Allergies   Allergen Reactions    Grass Pollen(K-O-R-T-Swt Sean) Hives       Physical Exam:  /87   Pulse 70   Temp 97.2 °F (36.2 °C)   Ht 5' 3\" (1.6 m)   Wt 238 lb 12.8 oz (108.3 kg)   SpO2 100%   BMI 42.30 kg/m²   Physical Exam  Constitutional:       General: She is not in acute distress. Appearance: She is not ill-appearing, toxic-appearing or diaphoretic. HENT:      Head: Normocephalic. Nose: Nose normal.      Mouth/Throat:      Mouth: Mucous membranes are moist.   Eyes:      General: No scleral icterus. Extraocular Movements: Extraocular movements intact. Cardiovascular:      Rate and Rhythm: Normal rate and regular rhythm. Heart sounds: No murmur heard. Pulmonary:      Effort: Pulmonary effort is normal. No respiratory distress. Breath sounds: No stridor. No wheezing. Abdominal:      General: Abdomen is flat. There is no distension. Palpations: There is no mass. Tenderness: There is no abdominal tenderness. Musculoskeletal:         General: Normal range of motion. Cervical back: Normal range of motion. No rigidity or tenderness. Right lower leg: No edema. Left lower leg: No edema. Lymphadenopathy:      Cervical: Cervical adenopathy (Left submental lymph node, small barely palpable) present. Skin:     General: Skin is warm. Coloration: Skin is not jaundiced or pale. Findings: No lesion or rash. Neurological:      General: No focal deficit present. Mental Status: She is alert and oriented to person, place, and time. Psychiatric:         Mood and Affect: Mood normal.         Behavior: Behavior normal.         Thought Content:  Thought content normal.       ECOG PS 0        ASSESSMENT:    Left submental (level 1A) adenopathy  Eosinophilia  Pseudotumor cerebri  Bipolar 1 disorder  ADHD, PTSD  Depression and anxiety  History of hidradenitis    The patient is a 34 y.o. female who established with me on 1/6/2023. She was referred by her PCP. As of date of consultation visit, she complained of a years worth of left submental lymph node enlargement, which waxes and wanes, increasing and decreasing in size on 3 different occasions. She denies of systemic symptoms associated with this or any other adenopathy/organomegaly elsewhere. She candis on 11/14/2022, CT soft tissue neck done noting nonspecific mild enlargement of left submental (level 1A) lymph node, measuring 1.7 x 1.3 cm. Reactive etiology is favored according to the study. I have also reviewed that she has had eosinophilia since 10/1/2018. In July 2022, eosinophil count was 1.61. No overt findings on history taking suggestive of this eosinophilia. However she does smoke marijuana medical purposes due to MSK related pain. Consider this as a possible contributory factor for the eosinophilia in addition to the adenopathy. She was also found to have thrombocytosis that has been ongoing as well. We will consider possible underlying iron deficiency versus association with the above. She denies of heavy menstrual cycles or any other type of bleed. Work-up initiated on 1/6/2023 which included:  - CBC: WBC 14.4, hemoglobin 13.2, platelet count 373,710, ANC 9.22, and eosinophil count 1.39  - CMP largely unremarkable except bicarb is 18, in the setting of Diamox use  - IgG 1241, IgA 344, IgM 166, IgE 31 (IgG subsets largely unremarkable apart from IgG2 = 618, slightly elevated)  -   - Serum iron 44 (saturation 12%), TIBC 360, ferritin 28  - UA largely unremarkable except for turbid appearing urine, with microscopy noting rare bacteria  - Peripheral blood smear noted leukocytosis with mature granulocytosis and mild thrombocytosis. No dysplastic changes/hypersegmentation noted. Leukocytes appear grossly morphologically normal.  Neutrophilia appears reactive.   - Flow cytometry noted mild relative increase in eosinophils without diagnostic immunophenotypic abnormalities. - BCR ABL 1 not detected    PLAN:  Labs and imaging was reviewed with patient  Will check CBC today  JAK2 status not back yet, will wait for results  Will send for testing for [id number] FISH    Again I discussed role of bone marrow biopsy   Will hold off at this time  Will hold off on biopsy of cervical lymph node at this time  The patient was previously established with Dr. Luz Heaton with ENT, I suggested to call regarding her cervical lymph node  But if unable to get in, due to insurance issues, would offer ENT referral  Also discussed her medical marijuana use which may be contributing to her adenopathy and eosinophilia. She states that she has been cutting down of late. Will monitor cell counts that she cuts on her marijuana use. DISPO:   RTC 3 weeks with labs      Thank you for allowing us to participate in the care of Carlos Amezquita       Approximately spent 32 minutes on chart review as well as time spent on patient encounter, discussing the laboratory, imaging, and clinical findings. I have discussed clinical implications and recommendations on the patient's primary issues. More than 50% of time was spent counseling patient. The patient verbalized understanding.       [address] 01/20/23 2:19 PM    [facility]) Office  P: [phone]  F: [phone]    Sandy Hopkins) Office  P: [phone]  F: [phone]

## 2023-01-21 LAB
Lab: NORMAL
THIS TEST SENT TO: NORMAL

## 2023-01-27 ENCOUNTER — APPOINTMENT (OUTPATIENT)
Dept: INFUSION THERAPY | Age: 33
End: 2023-01-27
Payer: COMMERCIAL

## 2023-02-17 ENCOUNTER — OFFICE VISIT (OUTPATIENT)
Dept: ONCOLOGY | Age: 33
End: 2023-02-17
Payer: COMMERCIAL

## 2023-02-17 ENCOUNTER — HOSPITAL ENCOUNTER (OUTPATIENT)
Dept: INFUSION THERAPY | Age: 33
Discharge: HOME | End: 2023-02-17
Payer: COMMERCIAL

## 2023-02-17 VITALS
DIASTOLIC BLOOD PRESSURE: 74 MMHG | TEMPERATURE: 96.6 F | HEIGHT: 63 IN | SYSTOLIC BLOOD PRESSURE: 114 MMHG | HEART RATE: 62 BPM | WEIGHT: 237.2 LBS | OXYGEN SATURATION: 100 % | BODY MASS INDEX: 42.03 KG/M2

## 2023-02-17 DIAGNOSIS — D75.839 THROMBOCYTOSIS: ICD-10-CM

## 2023-02-17 DIAGNOSIS — D72.10 EOSINOPHILIA, UNSPECIFIED TYPE: ICD-10-CM

## 2023-02-17 DIAGNOSIS — R59.0 LEFT CERVICAL LYMPHADENOPATHY: Primary | ICD-10-CM

## 2023-02-17 LAB
ALBUMIN SERPL-MCNC: 3.9 G/DL (ref 3.5–5.2)
ALP BLD-CCNC: 82 U/L (ref 35–104)
ALT SERPL-CCNC: 13 U/L (ref 0–32)
ANION GAP SERPL CALCULATED.3IONS-SCNC: 7 MMOL/L (ref 7–16)
AST SERPL-CCNC: 13 U/L (ref 0–31)
BASOPHILS ABSOLUTE: 0.06 E9/L (ref 0–0.2)
BASOPHILS RELATIVE PERCENT: 0.6 % (ref 0–2)
BILIRUB SERPL-MCNC: <0.2 MG/DL (ref 0–1.2)
BUN BLDV-MCNC: 11 MG/DL (ref 6–20)
CALCIUM SERPL-MCNC: 8.5 MG/DL (ref 8.6–10.2)
CHLORIDE BLD-SCNC: 108 MMOL/L (ref 98–107)
CO2: 22 MMOL/L (ref 22–29)
CREAT SERPL-MCNC: 0.6 MG/DL (ref 0.5–1)
EOSINOPHILS ABSOLUTE: 1.25 E9/L (ref 0.05–0.5)
EOSINOPHILS RELATIVE PERCENT: 12.8 % (ref 0–6)
GFR SERPL CREATININE-BSD FRML MDRD: >60 ML/MIN/1.73
GLUCOSE BLD-MCNC: 95 MG/DL (ref 74–99)
HCT VFR BLD CALC: 39.7 % (ref 34–48)
HEMOGLOBIN: 12.6 G/DL (ref 11.5–15.5)
IMMATURE GRANULOCYTES #: 0.02 E9/L
IMMATURE GRANULOCYTES %: 0.2 % (ref 0–5)
LYMPHOCYTES ABSOLUTE: 2.6 E9/L (ref 1.5–4)
LYMPHOCYTES RELATIVE PERCENT: 26.5 % (ref 20–42)
MCH RBC QN AUTO: 30.1 PG (ref 26–35)
MCHC RBC AUTO-ENTMCNC: 31.7 % (ref 32–34.5)
MCV RBC AUTO: 95 FL (ref 80–99.9)
MONOCYTES ABSOLUTE: 0.48 E9/L (ref 0.1–0.95)
MONOCYTES RELATIVE PERCENT: 4.9 % (ref 2–12)
NEUTROPHILS ABSOLUTE: 5.39 E9/L (ref 1.8–7.3)
NEUTROPHILS RELATIVE PERCENT: 55 % (ref 43–80)
PDW BLD-RTO: 14.2 FL (ref 11.5–15)
PLATELET # BLD: 449 E9/L (ref 130–450)
PMV BLD AUTO: 9.7 FL (ref 7–12)
POTASSIUM SERPL-SCNC: 3.6 MMOL/L (ref 3.5–5)
RBC # BLD: 4.18 E12/L (ref 3.5–5.5)
SODIUM BLD-SCNC: 137 MMOL/L (ref 132–146)
TOTAL PROTEIN: 7 G/DL (ref 6.4–8.3)
WBC # BLD: 9.8 E9/L (ref 4.5–11.5)

## 2023-02-17 PROCEDURE — 80053 COMPREHEN METABOLIC PANEL: CPT

## 2023-02-17 PROCEDURE — 85025 COMPLETE CBC W/AUTO DIFF WBC: CPT

## 2023-02-17 PROCEDURE — G8427 DOCREV CUR MEDS BY ELIG CLIN: HCPCS | Performed by: STUDENT IN AN ORGANIZED HEALTH CARE EDUCATION/TRAINING PROGRAM

## 2023-02-17 PROCEDURE — G8417 CALC BMI ABV UP PARAM F/U: HCPCS | Performed by: STUDENT IN AN ORGANIZED HEALTH CARE EDUCATION/TRAINING PROGRAM

## 2023-02-17 PROCEDURE — 1036F TOBACCO NON-USER: CPT | Performed by: STUDENT IN AN ORGANIZED HEALTH CARE EDUCATION/TRAINING PROGRAM

## 2023-02-17 PROCEDURE — G8482 FLU IMMUNIZE ORDER/ADMIN: HCPCS | Performed by: STUDENT IN AN ORGANIZED HEALTH CARE EDUCATION/TRAINING PROGRAM

## 2023-02-17 PROCEDURE — 99214 OFFICE O/P EST MOD 30 MIN: CPT | Performed by: STUDENT IN AN ORGANIZED HEALTH CARE EDUCATION/TRAINING PROGRAM

## 2023-02-17 PROCEDURE — 36415 COLL VENOUS BLD VENIPUNCTURE: CPT

## 2023-02-17 ASSESSMENT — ENCOUNTER SYMPTOMS
GASTROINTESTINAL NEGATIVE: 1
SINUS PRESSURE: 0
COUGH: 0
SHORTNESS OF BREATH: 0
SINUS PAIN: 0

## 2023-02-17 NOTE — PROGRESS NOTES
[id number] [facility]  Anh Preciado  Dept: [phone]  Loc: [phone]  Clinic Progress Note    Referring Provider:  Gertrude Wharton MD    Reason for Visit:   Enlarged lymph node in neck    PCP:  Gertrude Wharton MD    Demographics: 34 y.o. female    Chief Complaint:   Chief Complaint   Patient presents with    Follow-up     eosinophilia       Subjective:  No major issues since her last follow-up. She continues to complain of the left cervical neck lymphadenopathy. Denies of any fevers, chills, unintended weight loss. She denies of other lymph nodes. She continues to smoke marijuana, but states that she is cutting down, and notes she is substituting with vaping tobacco.    HPI from Initial Outpatient Consultation (1/6/2023): The patient is a 34 y.o. female comes in after referral from PCP regarding enlarged cervical lymph node. Patient states that she has had symptoms at least for a year, in which this left submental lymph node would fluctuate in size on 3 different occasions. She denies of fevers, chills, night sweats, but states sensation of feeling warm/hot at night, requiring using fans. Per chart review she has had fluctuations in her weight, but largely stable, without a gradual decline. She did have a recent CT soft tissue neck done, suggesting benign appearance of this lymph node. She denies of any other lymph nodes elsewhere. I have also reviewed that she has had some eosinophilia since 2018. She denies of distant/international travel, exposure to recent infections, significant allergies apart from grass pollen (hives). She does have a dog at home, and states it is up-to-date with vaccinations. She denies being on herbal supplementation. She does use medical marijuana since 2017. She denies of significant headaches.     She denies of any bleeding, heavy menstrual cycles, nausea, vomiting, fevers or chills. She does have other issues such as pseudotumor cerebri/benign intracranial hypertension, and some musculoskeletal issues. I reviewed she has seen rheumatology, with unremarkable work-up. She admits feeling more fatigued of late. Review of Systems; Review of Systems   Constitutional:  Negative for chills, diaphoresis, fever and unexpected weight change. HENT:  Negative for sinus pressure and sinus pain. Eyes:  Negative for visual disturbance. Respiratory:  Negative for cough and shortness of breath. Cardiovascular:  Negative for chest pain and palpitations. Gastrointestinal: Negative. Genitourinary:  Negative for menstrual problem. Musculoskeletal: Negative. Skin: Negative. Neurological: Negative. Hematological:  Positive for adenopathy. Does not bruise/bleed easily. Psychiatric/Behavioral: Negative.          Past Medical History:      Diagnosis Date    ADHD     Anxiety     Benign intracranial hypertension     Bipolar 1 disorder (HCC)     Depression     Diabetes mellitus (HCC)     pre diabetes    Hidradenitis     Hyperlipidemia     PTSD (post-traumatic stress disorder)     Sciatica of left side      Patient Active Problem List   Diagnosis    Depression    History of ADHD    Viral URI with cough    Fatigue    Elevated blood pressure reading    Morbid obesity (HCC)    Sprained ankle    Osteoarthritis of multiple joints    Muscle spasm    Hidradenitis suppurativa    Thrombocytosis    Hidradenitis    Benign hypermobility syndrome    Diabetes mellitus ([facility] [address]        Past Surgical History:      Procedure Laterality Date    OTHER SURGICAL HISTORY      EXCISION OF LEFT AXILLARY HIDRADENITIS    TONSILLECTOMY  2008    WISDOM TOOTH EXTRACTION         Family History:  Family History   Problem Relation Age of Onset    Heart Disease Mother     High Blood Pressure Mother     Osteoarthritis Mother     High Blood Pressure Father     Kidney Disease Father     Cancer Maternal Aunt Cancer Maternal Uncle         colon    Cancer Paternal Aunt     Cancer Paternal Uncle         colon    Heart Attack Maternal Grandmother     No Known Problems Maternal Grandfather     Heart Attack Paternal Grandmother     Brain Cancer Paternal Grandfather        Medications:  Reviewed and reconciled. Current Outpatient Medications   Medication Sig Dispense Refill    vitamin D (ERGOCALCIFEROL) 1.25 MG (77407 UT) CAPS capsule TAKE 1 CAPSULE BY MOUTH ONE TIME PER WEEK 4 capsule 2    acetaZOLAMIDE (DIAMOX) 250 MG tablet Take 1 tablet by mouth 2 times daily 90 tablet 3    ipratropium (ATROVENT) 0.03 % nasal spray 2 sprays by Each Nostril route in the morning and 2 sprays in the evening. Do all this for 7 days. 1 each 0     No current facility-administered medications for this visit.          Social History:  Social History     Socioeconomic History    Marital status: Single     Spouse name: Not on file    Number of children: Not on file    Years of education: Not on file    Highest education level: Not on file   Occupational History     Employer: NOT EMPLOYED   Tobacco Use    Smoking status: Former     Types: Cigarettes     Quit date: 2015     Years since quittin.2    Smokeless tobacco: Never    Tobacco comments:     occasional; quit smoking in    Vaping Use    Vaping Use: Never used   Substance and Sexual Activity    Alcohol use: Yes     Comment: Social    Drug use: Yes     Types: Marijuana Ria Lan     Comment: medical--- 2-3 times per day    Sexual activity: Yes     Partners: Male   Other Topics Concern    Not on file   Social History Narrative    Not on file     Social Determinants of Health     Financial Resource Strain: Low Risk     Difficulty of Paying Living Expenses: Not hard at all   Food Insecurity: No Food Insecurity    Worried About Running Out of Food in the Last Year: Never true    Ran Out of Food in the Last Year: Never true   Transportation Needs: Not on file   Physical Activity: Not on file Stress: Not on file   Social Connections: Not on file   Intimate Partner Violence: Not on file   Housing Stability: Not on file       Allergies: Allergies   Allergen Reactions    Grass Pollen(K-O-R-T-Swt Sean) Hives       Physical Exam:  /74   Pulse 62   Temp (!) 96.6 °F (35.9 °C)   Ht 5' 3\" (1.6 m)   Wt 237 lb 3.2 oz (107.6 kg)   SpO2 100%   BMI 42.02 kg/m²   Physical Exam  Constitutional:       General: She is not in acute distress. Appearance: She is not ill-appearing, toxic-appearing or diaphoretic. HENT:      Head: Normocephalic. Nose: Nose normal.      Mouth/Throat:      Mouth: Mucous membranes are moist.   Eyes:      General: No scleral icterus. Extraocular Movements: Extraocular movements intact. Cardiovascular:      Rate and Rhythm: Normal rate. Pulmonary:      Effort: No respiratory distress. Breath sounds: No stridor. Abdominal:      General: There is no distension. Musculoskeletal:         General: Normal range of motion. Cervical back: Normal range of motion. Lymphadenopathy:      Cervical: Cervical adenopathy (Left submental lymph node, small barely palpable) present. Skin:     General: Skin is warm. Coloration: Skin is not jaundiced or pale. Findings: No lesion or rash. Neurological:      General: No focal deficit present. Mental Status: She is alert and oriented to person, place, and time. Psychiatric:         Mood and Affect: Mood normal.         Behavior: Behavior normal.         Thought Content: Thought content normal.       ECOG PS 0        ASSESSMENT:    Left submental (level 1A) adenopathy  Eosinophilia  Pseudotumor cerebri  Bipolar 1 disorder  ADHD, PTSD  Depression and anxiety  History of hidradenitis  Chronic marijuana use    The patient is a 34 y.o. female who established with me on 1/6/2023. She was referred by her PCP.   As of date of consultation visit, she complained of a years worth of left submental lymph node enlargement, which waxes and wanes, increasing and decreasing in size on 3 different occasions. She denies of systemic symptoms associated with this or any other adenopathy/organomegaly elsewhere. She candis on 11/14/2022, CT soft tissue neck done noting nonspecific mild enlargement of left submental (level 1A) lymph node, measuring 1.7 x 1.3 cm. Reactive etiology is favored according to the study. I have also reviewed that she has had eosinophilia since 10/1/2018. In July 2022, eosinophil count was 1.61. No overt findings on history taking suggestive of this eosinophilia. However she does smoke marijuana medical purposes due to MSK related pain. Consider this as a possible contributory factor for the eosinophilia in addition to the adenopathy. She was also found to have thrombocytosis that has been ongoing as well. We will consider possible underlying iron deficiency versus association with the above. She denies of heavy menstrual cycles or any other type of bleed. Work-up initiated on 1/6/2023 which included:  - CBC: WBC 14.4, hemoglobin 13.2, platelet count 876,175, ANC 9.22, and eosinophil count 1.39  - CMP largely unremarkable except bicarb is 18, in the setting of Diamox use  - IgG 1241, IgA 344, IgM 166, IgE 31 (IgG subsets largely unremarkable apart from IgG2 = 618, slightly elevated)  -   - Serum iron 44 (saturation 12%), TIBC 360, ferritin 28  - UA largely unremarkable except for turbid appearing urine, with microscopy noting rare bacteria  - Peripheral blood smear noted leukocytosis with mature granulocytosis and mild thrombocytosis. No dysplastic changes/hypersegmentation noted. Leukocytes appear grossly morphologically normal.  Neutrophilia appears reactive. - Flow cytometry noted mild relative increase in eosinophils without diagnostic immunophenotypic abnormalities.   - BCR ABL 1 not detected  - JAK2, CALR, MPL were all negative    Also checked peripheral blood [id number], which is negative. PLAN:  Patient denies of significant changes  But continues to complain of left cervical lymphadenopathy  She has not called Dr. Toby Savage yet; will assist and place in formal referral  Again I discussed role of bone marrow biopsy. Patient would like to hold off. Consider the lymphadenopathy and eosinophilia possibly associated with marijuana use; patient has been trying to cut down  She also mentioned allergy to grass  Will monitor counts closely    DISPO:   RTC 3 months with labs      Thank you for allowing us to participate in the care of Taylor Pisano       Approximately spent 31 minutes on chart review as well as time spent on patient encounter, discussing the laboratory, imaging, and clinical findings. I have discussed clinical implications and recommendations on the patient's primary issues. More than 50% of time was spent counseling patient. The patient verbalized understanding.       [address] 02/17/23 11:52 AM    Sejal Garcia [facility]) Office  P: [phone]  F: [phone]    Luz Maria Hopkins) Office  P: [phone]  F: [phone]

## 2023-03-20 ENCOUNTER — OFFICE VISIT (OUTPATIENT)
Dept: NEUROLOGY | Age: 33
End: 2023-03-20
Payer: COMMERCIAL

## 2023-03-20 VITALS
HEART RATE: 58 BPM | OXYGEN SATURATION: 98 % | BODY MASS INDEX: 42.52 KG/M2 | WEIGHT: 240 LBS | TEMPERATURE: 97.7 F | HEIGHT: 63 IN | SYSTOLIC BLOOD PRESSURE: 115 MMHG | DIASTOLIC BLOOD PRESSURE: 81 MMHG

## 2023-03-20 DIAGNOSIS — G93.2 INTRACRANIAL HYPERTENSION: Primary | ICD-10-CM

## 2023-03-20 DIAGNOSIS — H46.9 OPTIC NEURITIS, LEFT: ICD-10-CM

## 2023-03-20 PROCEDURE — G8482 FLU IMMUNIZE ORDER/ADMIN: HCPCS | Performed by: NURSE PRACTITIONER

## 2023-03-20 PROCEDURE — 99213 OFFICE O/P EST LOW 20 MIN: CPT | Performed by: NURSE PRACTITIONER

## 2023-03-20 PROCEDURE — 1036F TOBACCO NON-USER: CPT | Performed by: NURSE PRACTITIONER

## 2023-03-20 PROCEDURE — G8417 CALC BMI ABV UP PARAM F/U: HCPCS | Performed by: NURSE PRACTITIONER

## 2023-03-20 PROCEDURE — G8427 DOCREV CUR MEDS BY ELIG CLIN: HCPCS | Performed by: NURSE PRACTITIONER

## 2023-03-20 NOTE — PROGRESS NOTES
[address] [facility]. Bud Sandoval M.D., F.A.C.P. Emeterio Resendez, DESHAUN, APRN, CNS  Rayshawn Tillman. Char Sierra, MSN, APRN-FNP-C  Nakul Larios MSN, APRN, FNP-C  Julieta RUEDA, PA-C  [address] MSN, APRN, FNP-C  [address] [facility] Sant[place] g[place], [address]  Phone: [phone]  Fax: [phone]       Jhoan Cuadra is a 34 y.o. right handed female     Presents to neurology clinic today for evaluation and management of [address] [facility],[address]    Patient is a decent historian and provides her history. During her initial visit here in June, patient reported she had gone to a routine eye exam in a local [facility] optometry center in February or March. 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 Patient began to notice that she had difficulty focusing even with new prescription glasses prior to that exam in March. Patient's peripheral vision to the left is severely impaired. Patient also has blurred vision to the left eye. Close up patient is able to see words however further back words on the page or posterior look like foreign language and that letters are combined. Patient's right eye continues to have perfect vision. Patient follow back up with her [facility] in April and had more testing completed about 1 to 2 weeks after that. Patient states her vision has not changed at all over this time period. Patient says nothing makes this better or worse. Patient had MRI of the orbits completed in April which suggest possible idiopathic intracranial hypertension. Patient was sent for MRI of the brain few days later on April 9 which was consistent with idiopathic intracranial hypertension. Patient does not have a primary care and it is taken this long to get into see neurology. Patient denies headache, speech or swallowing difficulty, focal weakness, numbness or tingling of extremities.     In July , patient reports that since her

## 2023-04-17 ENCOUNTER — TELEPHONE (OUTPATIENT)
Dept: FAMILY MEDICINE CLINIC | Age: 33
End: 2023-04-17

## 2023-04-17 ENCOUNTER — HOSPITAL ENCOUNTER (OUTPATIENT)
Age: 33
Discharge: HOME | End: 2023-04-17
Payer: COMMERCIAL

## 2023-04-17 DIAGNOSIS — E55.9 VITAMIN D DEFICIENCY: ICD-10-CM

## 2023-04-17 DIAGNOSIS — E55.9 VITAMIN D DEFICIENCY: Primary | ICD-10-CM

## 2023-04-17 LAB — VITAMIN D 25-HYDROXY: 32 NG/ML (ref 30–100)

## 2023-04-17 PROCEDURE — 82306 VITAMIN D 25 HYDROXY: CPT

## 2023-04-17 PROCEDURE — 36415 COLL VENOUS BLD VENIPUNCTURE: CPT

## 2023-04-17 NOTE — TELEPHONE ENCOUNTER
Pt called in requesting refill on vitamin d and was was informed that she would need an order placed to check her levels but order was not in chart placed vitamin d order and pt informed.

## 2023-04-17 NOTE — DISCHARGE INSTRUCTIONS
Visit Discharge/Physician Orders    Discharge condition: Stable    Assessment of pain at discharge: Minimal    Anesthetic used: Lidocaine 4%    Discharge to: Home    Left via:Private automobile    Accompanied by: accompanied by self    ECF/HHA:     Dressing Orders: Left buttocks ulcer: Cleanse with normal saline, pack with PLAIN RIBBON and cover with border gauze. Change daily. Limit pressure to wound as much as possible. Treatment Orders: FOLLOW NUTRITIOUS DIET. CHOOSE FOODS HIGH IN PROTEIN -CHICKEN- FISH-AND EGGS,  CHOOSE FOODS HIGH IN VITAMIN C.   MULTIVITAMIN DAILY. Referral sent for General Surgery. Culture taken in clinic today. [facility] followup visit _____3 weeks________________________  (Please note your next appointment above and if you are unable to keep, kindly give a 24 hour notice. Thank you.)    Physician signature:__________________________      If you experience any of the following, please call the [facility] during business hours:    * Increase in Pain  * Temperature over 101  * Increase in drainage from your wound  * Drainage with a foul odor  * Bleeding  * Increase in swelling  * Need for compression bandage changes due to slippage, breakthrough drainage. If you need medical attention outside of the business hours of the [facility] please contact your PCP or go to the nearest emergency room.

## 2023-04-18 ENCOUNTER — HOSPITAL ENCOUNTER (OUTPATIENT)
Dept: WOUND CARE | Age: 33
Discharge: HOME | End: 2023-04-18
Payer: COMMERCIAL

## 2023-04-18 ENCOUNTER — TELEPHONE (OUTPATIENT)
Dept: FAMILY MEDICINE CLINIC | Age: 33
End: 2023-04-18

## 2023-04-18 VITALS
WEIGHT: 240 LBS | RESPIRATION RATE: 18 BRPM | DIASTOLIC BLOOD PRESSURE: 80 MMHG | HEART RATE: 58 BPM | SYSTOLIC BLOOD PRESSURE: 132 MMHG | HEIGHT: 63 IN | BODY MASS INDEX: 42.52 KG/M2 | TEMPERATURE: 96.5 F

## 2023-04-18 DIAGNOSIS — L73.2 HIDRADENITIS SUPPURATIVA: Primary | ICD-10-CM

## 2023-04-18 PROCEDURE — 99203 OFFICE O/P NEW LOW 30 MIN: CPT | Performed by: FAMILY MEDICINE

## 2023-04-18 PROCEDURE — 11042 DBRDMT SUBQ TIS 1ST 20SQCM/<: CPT

## 2023-04-18 PROCEDURE — 99213 OFFICE O/P EST LOW 20 MIN: CPT

## 2023-04-18 PROCEDURE — 87070 CULTURE OTHR SPECIMN AEROBIC: CPT

## 2023-04-18 PROCEDURE — 87205 SMEAR GRAM STAIN: CPT

## 2023-04-18 PROCEDURE — 87075 CULTR BACTERIA EXCEPT BLOOD: CPT

## 2023-04-18 PROCEDURE — 11042 DBRDMT SUBQ TIS 1ST 20SQCM/<: CPT | Performed by: FAMILY MEDICINE

## 2023-04-18 RX ORDER — CHOLECALCIFEROL (VITAMIN D3) 125 MCG
500 CAPSULE ORAL DAILY
COMMUNITY

## 2023-04-18 RX ORDER — LIDOCAINE HYDROCHLORIDE 40 MG/ML
SOLUTION TOPICAL ONCE
OUTPATIENT
Start: 2023-04-18 | End: 2023-04-18

## 2023-04-18 RX ORDER — GLUCOSAMINE SULFATE 500 MG
1 CAPSULE ORAL DAILY
COMMUNITY

## 2023-04-18 RX ORDER — CLOBETASOL PROPIONATE 0.5 MG/G
OINTMENT TOPICAL ONCE
OUTPATIENT
Start: 2023-04-18 | End: 2023-04-18

## 2023-04-18 RX ORDER — GINSENG 100 MG
CAPSULE ORAL ONCE
OUTPATIENT
Start: 2023-04-18 | End: 2023-04-18

## 2023-04-18 RX ORDER — LIDOCAINE 40 MG/G
CREAM TOPICAL ONCE
OUTPATIENT
Start: 2023-04-18 | End: 2023-04-18

## 2023-04-18 RX ORDER — BACITRACIN ZINC AND POLYMYXIN B SULFATE 500; 1000 [USP'U]/G; [USP'U]/G
OINTMENT TOPICAL ONCE
OUTPATIENT
Start: 2023-04-18 | End: 2023-04-18

## 2023-04-18 RX ORDER — LIDOCAINE HYDROCHLORIDE 40 MG/ML
SOLUTION TOPICAL ONCE
Status: COMPLETED | OUTPATIENT
Start: 2023-04-18 | End: 2023-04-18

## 2023-04-18 RX ORDER — LIDOCAINE 50 MG/G
OINTMENT TOPICAL ONCE
OUTPATIENT
Start: 2023-04-18 | End: 2023-04-18

## 2023-04-18 RX ORDER — LIDOCAINE HYDROCHLORIDE 20 MG/ML
JELLY TOPICAL ONCE
OUTPATIENT
Start: 2023-04-18 | End: 2023-04-18

## 2023-04-18 RX ORDER — PERPHENAZINE 4 MG
TABLET ORAL
COMMUNITY

## 2023-04-18 RX ORDER — BETAMETHASONE DIPROPIONATE 0.05 %
OINTMENT (GRAM) TOPICAL ONCE
OUTPATIENT
Start: 2023-04-18 | End: 2023-04-18

## 2023-04-18 RX ORDER — GENTAMICIN SULFATE 1 MG/G
OINTMENT TOPICAL ONCE
OUTPATIENT
Start: 2023-04-18 | End: 2023-04-18

## 2023-04-18 RX ORDER — BACITRACIN, NEOMYCIN, POLYMYXIN B 400; 3.5; 5 [USP'U]/G; MG/G; [USP'U]/G
OINTMENT TOPICAL ONCE
OUTPATIENT
Start: 2023-04-18 | End: 2023-04-18

## 2023-04-18 RX ADMIN — LIDOCAINE HYDROCHLORIDE 10 ML: 40 SOLUTION TOPICAL at 14:28

## 2023-04-18 ASSESSMENT — PAIN DESCRIPTION - ORIENTATION: ORIENTATION: LEFT

## 2023-04-18 ASSESSMENT — PAIN DESCRIPTION - FREQUENCY: FREQUENCY: INTERMITTENT

## 2023-04-18 ASSESSMENT — PAIN - FUNCTIONAL ASSESSMENT: PAIN_FUNCTIONAL_ASSESSMENT: ACTIVITIES ARE NOT PREVENTED

## 2023-04-18 ASSESSMENT — PAIN DESCRIPTION - LOCATION: LOCATION: BUTTOCKS

## 2023-04-18 ASSESSMENT — PAIN DESCRIPTION - PAIN TYPE: TYPE: CHRONIC PAIN

## 2023-04-18 ASSESSMENT — PAIN SCALES - GENERAL: PAINLEVEL_OUTOF10: 9

## 2023-04-18 ASSESSMENT — PAIN DESCRIPTION - DESCRIPTORS: DESCRIPTORS: SHARP;BURNING

## 2023-04-18 ASSESSMENT — PAIN DESCRIPTION - ONSET: ONSET: ON-GOING

## 2023-04-18 NOTE — TELEPHONE ENCOUNTER
----- Message from Jeanna Prather sent at 4/18/2023 12:12 PM EDT -----  Subject: Message to Provider    QUESTIONS  Information for Provider? Pt had a appt April 18 at 12 Pt thought it a   virtual visit and hasnt heard anything from PCP. It doesnt show in her   noted that visit was VV.   ---------------------------------------------------------------------------  --------------  Yash LOCKE  [phone]; OK to leave message on voicemail  ---------------------------------------------------------------------------  --------------  SCRIPT ANSWERS  Relationship to Patient?  Self

## 2023-04-18 NOTE — PLAN OF CARE
Problem: Pain  Goal: Verbalizes/displays adequate comfort level or baseline comfort level  Outcome: Progressing     Problem: Cognitive:  Goal: Knowledge of wound care  Description: Knowledge of wound care  Outcome: Progressing  Goal: Understands risk factors for wounds  Description: Understands risk factors for wounds  Outcome: Progressing     Problem: Wound:  Goal: Will show signs of wound healing; wound closure and no evidence of infection  Description: Will show signs of wound healing; wound closure and no evidence of infection  Outcome: Progressing

## 2023-04-18 NOTE — PROGRESS NOTES
04/18/23 1519   Dressing/Treatment Packing;Dry dressing 04/18/23 1519   Wound Length (cm) 0.9 cm 04/18/23 1427   Wound Width (cm) 0.9 cm 04/18/23 1427   Wound Depth (cm) 2 cm 04/18/23 1427   Wound Surface Area (cm^2) 0.81 cm^2 04/18/23 1427   Wound Volume (cm^3) 1.62 cm^3 04/18/23 1427   Post-Procedure Length (cm) 1 cm 04/18/23 1447   Post-Procedure Width (cm) 1 cm 04/18/23 1447   Post-Procedure Depth (cm) 2.1 cm 04/18/23 1447   Post-Procedure Surface Area (cm^2) 1 cm^2 04/18/23 1447   Post-Procedure Volume (cm^3) 2.1 cm^3 04/18/23 1447   Wound Assessment Pink/red; Hyper granulation tissue 04/18/23 1427   Drainage Amount Moderate 04/18/23 1427   Drainage Description Yellow;Serosanguinous 04/18/23 1427   Odor None 04/18/23 1427   Yessica-wound Assessment Intact 04/18/23 1427   Number of days: 0               Procedure: Excisional Debridement: Wound # 1. At 0.81 cm sq  The patient was placed in the left lateral position. Lidocaine  gauze was applied  at beginning of wound evaluation. An  Excisional Debridement was performed. Using a curette ,  the wound was debrided sharply of all fibrotic, necrotic, and hyperkeratotic tissue, including a layer of surrounding healthy tissue to stimulate epithelization. The wound was excised through the level of the subcutaneous Wound Percentage debrided is 100%   Wound was irrigated with normal saline solution. Bleeding was with a small amount of bleeding, and controlled with pressure . Patient tolerated procedure well and was given proper instruction. Active Problems:    Diabetes mellitus ([address]    Morbid obesity ([address]    Hidradenitis suppurativa  Resolved Problems:    * No resolved hospital problems. *       Diagnosis:           HS on Right buttock, will need Gen Surg consult   @[id number])@              Plan:  1. H&P, General medical evaluation for the purpose of Comprehensive wound management for maximum healing and minimal morbidity.            2. Excisional
No

## 2023-04-21 LAB
BACTERIA WND AEROBE CULT: NORMAL
GRAM STN SPEC: NORMAL

## 2023-04-23 LAB — BACTERIA SPEC ANAEROBE CULT: NORMAL

## 2023-04-25 ENCOUNTER — INITIAL CONSULT (OUTPATIENT)
Dept: SURGERY | Age: 33
End: 2023-04-25
Payer: COMMERCIAL

## 2023-04-25 VITALS
HEIGHT: 63 IN | RESPIRATION RATE: 16 BRPM | HEART RATE: 65 BPM | BODY MASS INDEX: 42.52 KG/M2 | TEMPERATURE: 97.5 F | DIASTOLIC BLOOD PRESSURE: 87 MMHG | WEIGHT: 240 LBS | SYSTOLIC BLOOD PRESSURE: 121 MMHG

## 2023-04-25 DIAGNOSIS — L73.2 HIDRADENITIS SUPPURATIVA OF ANUS: Primary | ICD-10-CM

## 2023-04-25 PROCEDURE — G8417 CALC BMI ABV UP PARAM F/U: HCPCS | Performed by: SURGERY

## 2023-04-25 PROCEDURE — 1036F TOBACCO NON-USER: CPT | Performed by: SURGERY

## 2023-04-25 PROCEDURE — G8427 DOCREV CUR MEDS BY ELIG CLIN: HCPCS | Performed by: SURGERY

## 2023-04-25 PROCEDURE — 99203 OFFICE O/P NEW LOW 30 MIN: CPT | Performed by: SURGERY

## 2023-04-25 NOTE — PROGRESS NOTES
General Surgery History and Physical  [facility]    Patient's Name/Date of Birth: Matthew Simeon / 1990    Date: April 25, 2023     Surgeon: Cinthia Forrester MD    PCP: Meagan Cruz MD     Chief Complaint: hidradenitis    HPI:   Matthew Simeon is a 34 y.o. female who presents for evaluation of hidradenitis of perianal region. She is dealt with hidradenitis for the last 18 years and multiple areas of her body. Most recently, she had a flare in her left buttock/perianal region. She has been undergoing treatment with antibiotics and this is improved. She was however seen by her wound care doctor and was referred for possible surgical intervention. She denies any fevers or chills. She has had minimal drainage. Patient Active Problem List   Diagnosis    Depression    History of ADHD    Viral URI with cough    Fatigue    Elevated blood pressure reading    Morbid obesity (HCC)    Sprained ankle    Osteoarthritis of multiple joints    Muscle spasm    Hidradenitis suppurativa    Thrombocytosis    Hidradenitis suppurativa of anus    Benign hypermobility syndrome    Diabetes mellitus ([address]       Past Medical History:   Diagnosis Date    ADHD     Anxiety     Benign intracranial hypertension     Bipolar 1 disorder ([address]     Depression     Diabetes mellitus ([address]     pre diabetes    Hidradenitis     Hyperlipidemia     PTSD (post-traumatic stress disorder)     Sciatica of left side        Past Surgical History:   Procedure Laterality Date    OTHER SURGICAL HISTORY      EXCISION OF LEFT AXILLARY HIDRADENITIS    TONSILLECTOMY  2008    WISDOM TOOTH EXTRACTION         Allergies   Allergen Reactions    Grass Pollen(K-[id number]-T-Swt Sean) Hives       The patient has a family history that is negative for severe cardiovascular or respiratory issues, negative for reaction to anesthesia. Time spent reviewing past medical, surgical, social and family history, vitals, nursing assessment and images.  No

## 2023-05-09 ENCOUNTER — HOSPITAL ENCOUNTER (OUTPATIENT)
Dept: WOUND CARE | Age: 33
Discharge: HOME | End: 2023-05-09
Payer: COMMERCIAL

## 2023-05-09 VITALS
BODY MASS INDEX: 42.51 KG/M2 | RESPIRATION RATE: 18 BRPM | HEART RATE: 74 BPM | DIASTOLIC BLOOD PRESSURE: 69 MMHG | TEMPERATURE: 96.6 F | SYSTOLIC BLOOD PRESSURE: 119 MMHG | WEIGHT: 240 LBS

## 2023-05-09 DIAGNOSIS — L73.2 HIDRADENITIS SUPPURATIVA: Primary | ICD-10-CM

## 2023-05-09 DIAGNOSIS — E66.01 MORBID OBESITY (HCC): ICD-10-CM

## 2023-05-09 DIAGNOSIS — L73.2 HIDRADENITIS SUPPURATIVA OF ANUS: ICD-10-CM

## 2023-05-09 PROCEDURE — 99213 OFFICE O/P EST LOW 20 MIN: CPT | Performed by: FAMILY MEDICINE

## 2023-05-09 PROCEDURE — 99212 OFFICE O/P EST SF 10 MIN: CPT

## 2023-05-09 RX ORDER — LIDOCAINE HYDROCHLORIDE 40 MG/ML
SOLUTION TOPICAL ONCE
Status: CANCELLED | OUTPATIENT
Start: 2023-05-09 | End: 2023-05-09

## 2023-05-09 RX ORDER — GINSENG 100 MG
CAPSULE ORAL ONCE
Status: CANCELLED | OUTPATIENT
Start: 2023-05-09 | End: 2023-05-09

## 2023-05-09 RX ORDER — BETAMETHASONE DIPROPIONATE 0.05 %
OINTMENT (GRAM) TOPICAL ONCE
Status: CANCELLED | OUTPATIENT
Start: 2023-05-09 | End: 2023-05-09

## 2023-05-09 RX ORDER — LIDOCAINE HYDROCHLORIDE 20 MG/ML
JELLY TOPICAL ONCE
Status: CANCELLED | OUTPATIENT
Start: 2023-05-09 | End: 2023-05-09

## 2023-05-09 RX ORDER — BACITRACIN, NEOMYCIN, POLYMYXIN B 400; 3.5; 5 [USP'U]/G; MG/G; [USP'U]/G
OINTMENT TOPICAL ONCE
Status: CANCELLED | OUTPATIENT
Start: 2023-05-09 | End: 2023-05-09

## 2023-05-09 RX ORDER — LIDOCAINE HYDROCHLORIDE 40 MG/ML
SOLUTION TOPICAL ONCE
Status: DISCONTINUED | OUTPATIENT
Start: 2023-05-09 | End: 2023-05-09

## 2023-05-09 RX ORDER — GENTAMICIN SULFATE 1 MG/G
OINTMENT TOPICAL ONCE
Status: CANCELLED | OUTPATIENT
Start: 2023-05-09 | End: 2023-05-09

## 2023-05-09 RX ORDER — LIDOCAINE 50 MG/G
OINTMENT TOPICAL ONCE
Status: CANCELLED | OUTPATIENT
Start: 2023-05-09 | End: 2023-05-09

## 2023-05-09 RX ORDER — BACITRACIN ZINC AND POLYMYXIN B SULFATE 500; 1000 [USP'U]/G; [USP'U]/G
OINTMENT TOPICAL ONCE
Status: CANCELLED | OUTPATIENT
Start: 2023-05-09 | End: 2023-05-09

## 2023-05-09 RX ORDER — LIDOCAINE 40 MG/G
CREAM TOPICAL ONCE
Status: CANCELLED | OUTPATIENT
Start: 2023-05-09 | End: 2023-05-09

## 2023-05-09 RX ORDER — CLOBETASOL PROPIONATE 0.5 MG/G
OINTMENT TOPICAL ONCE
Status: CANCELLED | OUTPATIENT
Start: 2023-05-09 | End: 2023-05-09

## 2023-05-09 NOTE — PROGRESS NOTES
Wound Care Discharge Summary    Santino Leigh  34 y.o.   1990 female   5/9/2023 5/9/23  Patient Active Problem List   Diagnosis    Depression    History of ADHD    Viral URI with cough    Fatigue    Elevated blood pressure reading    Morbid obesity (HCC)    Sprained ankle    Osteoarthritis of multiple joints    Muscle spasm    Hidradenitis suppurativa    Thrombocytosis    Hidradenitis suppurativa of anus    Benign hypermobility syndrome    Diabetes mellitus ([address]       Final Wound Metrics:    Flow /69   Pulse 74   Temp (!) 96.6 °F (35.9 °C) (Tympanic)   Resp 18   Wt 240 lb (108.9 kg)   BMI 42.51 kg/m²                                                                                         Wound is Healed                                           Wound Reference Date is when first assessed. Measurements shown are from today's visit. Reason for Admission:  Principal Diagnosis:Hidradenitis Suprativa  Secondary Diagnosis: Diabetes  Procedures: Multiple serial debridements are recorded and these were performed at a time and in a manner that was deemed necessary over the course of therapy for the included conditions. Brief Summary of Patient's Course: Following initial Wound evaluation a comprehensive plan was formulated by the team to include weekly evaluation and debridement, nutritinal support, Glycemic control, infectious prevention, moisture balance, vascular competence and avoidance of trauma. Patient Condition: Stable      Patient Active Problem List   Diagnosis Code    Depression F32. A    History of ADHD Z86.59    Viral URI with cough J06.9    Fatigue R53.83    Elevated blood pressure reading R03.0    Morbid obesity (HCC) E66.01    Sprained ankle S93.409A    Osteoarthritis of multiple joints M15.9    Muscle spasm M62.838    Hidradenitis suppurativa L73.2    Thrombocytosis D75.839    Hidradenitis suppurativa of anus L73.2    Benign

## 2023-05-09 NOTE — PLAN OF CARE
Pre Procedure History & Physical    Chief Complaint:   Iron deficiency anemia, rectal bleeding    Subjective     HPI:   85 yo M here for eval of iron deficiency anemia, rectal bleeding.    Past Medical History:   Past Medical History:   Diagnosis Date   • Anemia, unspecified    • Atherosclerotic heart disease of native coronary artery without angina pectoris    • CAD (coronary artery disease)    • CKD (chronic kidney disease), stage III (HCC)    • Essential (primary) hypertension    • Gastric ulcer, unspecified as acute or chronic, without hemorrhage or perforation    • Gastroparesis    • GERD without esophagitis    • Glaucoma    • Hereditary and idiopathic neuropathy, unspecified    • History of falling    • HLD (hyperlipidemia)    • HTN (hypertension)    • Hypotension, unspecified    • Hypothyroidism     etiology is r/t amiodarone   • Irritable bowel syndrome without diarrhea    • Laceration without foreign body of right forearm, initial encounter    • Low back pain    • Malignant neoplasm of prostate (HCC)    • Mixed hyperlipidemia    • OA (osteoarthritis)    • Other specified disorders of the skin and subcutaneous tissue    • Pain in left foot    • Peripheral vascular disease, unspecified (HCC)    • Phimosis    • Presence of unspecified artificial knee joint    • Primary insomnia    • Primary osteoarthritis of both knees    • Prostate cancer (HCC)    • Sensorineural hearing loss (SNHL) of both ears    • Sigmoid diverticulosis     severe sigmoid and descending colon diverticulosis and 3+ gastritis   • Sleep related leg cramps    • Type 2 diabetes mellitus with diabetic polyneuropathy (HCC)     and gastroparesis   • Unspecified atrial fibrillation (HCC)    • Unspecified dementia without behavioral disturbance (HCC)    • Unspecified hearing loss, bilateral        Past Surgical History:  Past Surgical History:   Procedure Laterality Date   • CATARACT EXTRACTION Bilateral 2014   • COLONOSCOPY     • HAND SURGERY Right  Problem: Chronic Conditions and Co-morbidities  Goal: Patient's chronic conditions and co-morbidity symptoms are monitored and maintained or improved  Outcome: Completed     Problem: Wound:  Goal: Will show signs of wound healing; wound closure and no evidence of infection  Description: Will show signs of wound healing; wound closure and no evidence of infection  Outcome: Completed    • JOINT REPLACEMENT     • KNEE ARTHROSCOPY Right 03/14/2017   • PROSTATECTOMY     • REPLACEMENT TOTAL KNEE  03/2017   • UPPER GASTROINTESTINAL ENDOSCOPY         Family History:  Family History   Problem Relation Age of Onset   • Diabetes type II Mother    • Lung cancer Father        Social History:   reports that he quit smoking about 52 years ago. His smoking use included cigarettes. He started smoking about 79 years ago. He has a 27.00 pack-year smoking history. He has never used smokeless tobacco. He reports that he does not drink alcohol and does not use drugs.    Medications:   Medications Prior to Admission   Medication Sig Dispense Refill Last Dose   • amiodarone (PACERONE) 200 MG tablet Take 200 mg by mouth Daily.   6/30/2022 at Unknown time   • amLODIPine (NORVASC) 10 MG tablet Take 10 mg by mouth Daily.   6/30/2022 at Unknown time   • apixaban (ELIQUIS) 5 MG tablet tablet Take 5 mg by mouth 2 (Two) Times a Day.   6/30/2022 at Unknown time   • aspirin 81 MG EC tablet Take 81 mg by mouth Daily.   6/30/2022 at Unknown time   • empagliflozin (Jardiance) 25 MG tablet tablet Take 1 tablet by mouth Daily. PATIENT ASSISTANCE MEDICATION 90 tablet 3 6/30/2022 at Unknown time   • ferrous sulfate 325 (65 FE) MG tablet Take 325 mg by mouth 2 (Two) Times a Day.   6/30/2022 at Unknown time   • gabapentin (NEURONTIN) 100 MG capsule Take 1 capsule by mouth 2 (Two) Times a Day. 60 capsule 1 6/30/2022 at Unknown time   • glipizide (GLUCOTROL) 5 MG tablet Take 0.5 tablets by mouth 2 (Two) Times a Day Before Meals. 90 tablet 0 6/30/2022 at Unknown time   • Glucosamine 500 MG capsule Take 1,000 mg by mouth 2 (Two) Times a Day. OTC   6/30/2022 at Unknown time   • hydroCHLOROthiazide (HYDRODIURIL) 25 MG tablet Take 1 tablet by mouth Daily. Take 1/2 tab twice a day 90 tablet 1 6/30/2022 at Unknown time   • isosorbide mononitrate (IMDUR) 30 MG 24 hr tablet Take 30 mg by mouth Daily.   6/30/2022 at Unknown time   • latanoprost  (XALATAN) 0.005 % ophthalmic solution INSTILL 1 DROP INTO BOTH EYES DAILY   6/30/2022 at Unknown time   • levothyroxine (Synthroid) 200 MCG tablet Take 1 tablet by mouth Daily. 90 tablet 1 6/30/2022 at Unknown time   • metFORMIN (GLUCOPHAGE) 500 MG tablet Take 1 tablet by mouth 2 (Two) Times a Day With Meals. 180 tablet 0 6/30/2022 at Unknown time   • metoclopramide (REGLAN) 5 MG tablet Take 5 mg by mouth 2 (Two) Times a Day.   6/30/2022 at Unknown time   • multivitamin (THERAGRAN) tablet tablet Take 1 tablet by mouth Daily.   6/30/2022 at Unknown time   • pantoprazole (PROTONIX) 40 MG EC tablet Take 1 tablet by mouth Daily. 90 tablet 1 6/30/2022 at Unknown time   • sucralfate (Carafate) 1 g tablet Take 1 tablet by mouth 4 (Four) Times a Day. 360 tablet 0 6/30/2022 at Unknown time   • cholecalciferol (VITAMIN D3) 25 MCG (1000 UT) tablet Take 1,000 Units by mouth 2 (Two) Times a Day.      • nitroglycerin (NITROSTAT) 0.4 MG SL tablet nitroglycerin 0.4 mg sublingual tablet, sublingual place 1 tablet (0.4 mg) by buccal route at the first sign of an attack; no more than 3 tabs are recommended within a 15 minute period.   Active   Unknown at Unknown time       Allergies:  Meloxicam and Nsaids    ROS:    Pertinent items are noted in HPI     Objective     Blood pressure 148/75, pulse 105, temperature 97 °F (36.1 °C), temperature source Temporal, resp. rate 16, weight 66.4 kg (146 lb 6.2 oz), SpO2 94 %.    Physical Exam   Constitutional: Pt is oriented to person, place, and time and well-developed, well-nourished, and in no distress.   Mouth/Throat: Oropharynx is clear and moist.   Neck: Normal range of motion.   Cardiovascular: Normal rate, regular rhythm and normal heart sounds.    Pulmonary/Chest: Effort normal and breath sounds normal.   Abdominal: Soft. Nontender  Skin: Skin is warm and dry.   Psychiatric: Mood, memory, affect and judgment normal.     Assessment & Plan     Diagnosis:  Iron deficiency anemia, rectal  bleeding    Anticipated Surgical Procedure:  EGD/colonoscopy    The risks, benefits, and alternatives of this procedure have been discussed with the patient or the responsible party- the patient understands and agrees to proceed.

## 2023-05-17 ENCOUNTER — APPOINTMENT (OUTPATIENT)
Dept: INFUSION THERAPY | Age: 33
End: 2023-05-17
Payer: COMMERCIAL

## 2023-05-26 ENCOUNTER — OFFICE VISIT (OUTPATIENT)
Dept: ONCOLOGY | Age: 33
End: 2023-05-26
Payer: COMMERCIAL

## 2023-05-26 ENCOUNTER — HOSPITAL ENCOUNTER (OUTPATIENT)
Dept: INFUSION THERAPY | Age: 33
Discharge: HOME | End: 2023-05-26
Payer: COMMERCIAL

## 2023-05-26 VITALS
TEMPERATURE: 97.5 F | BODY MASS INDEX: 43.36 KG/M2 | HEIGHT: 63 IN | DIASTOLIC BLOOD PRESSURE: 80 MMHG | WEIGHT: 244.7 LBS | SYSTOLIC BLOOD PRESSURE: 116 MMHG | OXYGEN SATURATION: 100 % | HEART RATE: 64 BPM

## 2023-05-26 DIAGNOSIS — R59.0 LEFT CERVICAL LYMPHADENOPATHY: ICD-10-CM

## 2023-05-26 DIAGNOSIS — J39.2 DISORDER OF OROPHARYNX: ICD-10-CM

## 2023-05-26 DIAGNOSIS — D72.10 EOSINOPHILIA, UNSPECIFIED TYPE: ICD-10-CM

## 2023-05-26 DIAGNOSIS — D72.10 EOSINOPHILIA, UNSPECIFIED TYPE: Primary | ICD-10-CM

## 2023-05-26 LAB
ALBUMIN SERPL-MCNC: 4 G/DL (ref 3.5–5.2)
ALP SERPL-CCNC: 82 U/L (ref 35–104)
ALT SERPL-CCNC: 20 U/L (ref 0–32)
ANION GAP SERPL CALCULATED.3IONS-SCNC: 9 MMOL/L (ref 7–16)
AST SERPL-CCNC: 23 U/L (ref 0–31)
BASOPHILS # BLD: 0.06 E9/L (ref 0–0.2)
BASOPHILS NFR BLD: 0.5 % (ref 0–2)
BILIRUB SERPL-MCNC: 0.2 MG/DL (ref 0–1.2)
BUN SERPL-MCNC: 11 MG/DL (ref 6–20)
CALCIUM SERPL-MCNC: 8.6 MG/DL (ref 8.6–10.2)
CHLORIDE SERPL-SCNC: 104 MMOL/L (ref 98–107)
CO2 SERPL-SCNC: 25 MMOL/L (ref 22–29)
CREAT SERPL-MCNC: 0.6 MG/DL (ref 0.5–1)
EOSINOPHIL # BLD: 1.47 E9/L (ref 0.05–0.5)
EOSINOPHIL NFR BLD: 11.9 % (ref 0–6)
ERYTHROCYTE [DISTWIDTH] IN BLOOD BY AUTOMATED COUNT: 14.6 FL (ref 11.5–15)
GLUCOSE SERPL-MCNC: 109 MG/DL (ref 74–99)
HCT VFR BLD AUTO: 40.7 % (ref 34–48)
HGB BLD-MCNC: 12.9 G/DL (ref 11.5–15.5)
IMM GRANULOCYTES # BLD: 0.04 E9/L
IMM GRANULOCYTES NFR BLD: 0.3 % (ref 0–5)
LYMPHOCYTES # BLD: 3.19 E9/L (ref 1.5–4)
LYMPHOCYTES NFR BLD: 25.8 % (ref 20–42)
MCH RBC QN AUTO: 29.9 PG (ref 26–35)
MCHC RBC AUTO-ENTMCNC: 31.7 % (ref 32–34.5)
MCV RBC AUTO: 94.4 FL (ref 80–99.9)
MONOCYTES # BLD: 0.56 E9/L (ref 0.1–0.95)
MONOCYTES NFR BLD: 4.5 % (ref 2–12)
NEUTROPHILS # BLD: 7.05 E9/L (ref 1.8–7.3)
NEUTS SEG NFR BLD: 57 % (ref 43–80)
PLATELET # BLD AUTO: 431 E9/L (ref 130–450)
PMV BLD AUTO: 9.4 FL (ref 7–12)
POTASSIUM SERPL-SCNC: 4.2 MMOL/L (ref 3.5–5)
PROT SERPL-MCNC: 7.2 G/DL (ref 6.4–8.3)
RBC # BLD AUTO: 4.31 E12/L (ref 3.5–5.5)
SODIUM SERPL-SCNC: 138 MMOL/L (ref 132–146)
WBC # BLD: 12.4 E9/L (ref 4.5–11.5)

## 2023-05-26 PROCEDURE — 85025 COMPLETE CBC W/AUTO DIFF WBC: CPT

## 2023-05-26 PROCEDURE — 1036F TOBACCO NON-USER: CPT | Performed by: STUDENT IN AN ORGANIZED HEALTH CARE EDUCATION/TRAINING PROGRAM

## 2023-05-26 PROCEDURE — G8417 CALC BMI ABV UP PARAM F/U: HCPCS | Performed by: STUDENT IN AN ORGANIZED HEALTH CARE EDUCATION/TRAINING PROGRAM

## 2023-05-26 PROCEDURE — G8427 DOCREV CUR MEDS BY ELIG CLIN: HCPCS | Performed by: STUDENT IN AN ORGANIZED HEALTH CARE EDUCATION/TRAINING PROGRAM

## 2023-05-26 PROCEDURE — 80053 COMPREHEN METABOLIC PANEL: CPT

## 2023-05-26 PROCEDURE — 36415 COLL VENOUS BLD VENIPUNCTURE: CPT

## 2023-05-26 PROCEDURE — 99214 OFFICE O/P EST MOD 30 MIN: CPT | Performed by: STUDENT IN AN ORGANIZED HEALTH CARE EDUCATION/TRAINING PROGRAM

## 2023-05-26 ASSESSMENT — ENCOUNTER SYMPTOMS
GASTROINTESTINAL NEGATIVE: 1
SINUS PRESSURE: 0
COUGH: 0
SHORTNESS OF BREATH: 0
SINUS PAIN: 0

## 2023-05-26 NOTE — PROGRESS NOTES
Heart Disease Mother     High Blood Pressure Mother     Osteoarthritis Mother     High Blood Pressure Father     Kidney Disease Father     Cancer Maternal Aunt     Cancer Maternal Uncle         colon    Cancer Paternal Aunt     Cancer Paternal Uncle         colon    Heart Attack Maternal Grandmother     No Known Problems Maternal Grandfather     Heart Attack Paternal Grandmother     Brain Cancer Paternal Grandfather        Medications:  Reviewed and reconciled. Current Outpatient Medications   Medication Sig Dispense Refill    Glucosamine 500 MG CAPS Take 1 tablet by mouth daily      vitamin B-12 (CYANOCOBALAMIN) 500 MCG tablet Take 1 tablet by mouth daily      Multiple Vitamin (MULTIVITAMIN ADULT PO) Take by mouth      Collagen-Vitamin C-Biotin (COLLAGEN 1500/C) 500-50-0.8 MG CAPS Take by mouth      vitamin D (ERGOCALCIFEROL) 1.25 MG (64499 UT) CAPS capsule TAKE 1 CAPSULE BY MOUTH ONE TIME PER WEEK (Patient not taking: Reported on 2023) 4 capsule 2     No current facility-administered medications for this visit.          Social History:  Social History     Socioeconomic History    Marital status: Single     Spouse name: Not on file    Number of children: Not on file    Years of education: Not on file    Highest education level: Not on file   Occupational History     Employer: NOT EMPLOYED   Tobacco Use    Smoking status: Former     Types: Cigarettes     Quit date: 2015     Years since quittin.5    Smokeless tobacco: Never    Tobacco comments:     occasional; quit smoking in    Vaping Use    Vaping Use: Every day    Substances: Nicotine   Substance and Sexual Activity    Alcohol use: Yes     Comment: Social    Drug use: Yes     Types: Marijuana Annie Rothman     Comment: medical--- 2-3 times per day    Sexual activity: Yes     Partners: Male   Other Topics Concern    Not on file   Social History Narrative    Not on file     Social Determinants of Health     Financial Resource Strain: Low Risk

## 2023-07-10 ENCOUNTER — OFFICE VISIT (OUTPATIENT)
Dept: NEUROLOGY | Age: 33
End: 2023-07-10
Payer: COMMERCIAL

## 2023-07-10 VITALS
OXYGEN SATURATION: 97 % | HEART RATE: 72 BPM | HEIGHT: 65 IN | DIASTOLIC BLOOD PRESSURE: 89 MMHG | TEMPERATURE: 98.2 F | BODY MASS INDEX: 40.98 KG/M2 | WEIGHT: 246 LBS | SYSTOLIC BLOOD PRESSURE: 131 MMHG

## 2023-07-10 DIAGNOSIS — M79.672 FOOT PAIN, LEFT: Primary | ICD-10-CM

## 2023-07-10 DIAGNOSIS — H46.9 OPTIC NEURITIS, LEFT: ICD-10-CM

## 2023-07-10 DIAGNOSIS — G93.2 INTRACRANIAL HYPERTENSION: ICD-10-CM

## 2023-07-10 PROCEDURE — 99214 OFFICE O/P EST MOD 30 MIN: CPT | Performed by: NURSE PRACTITIONER

## 2023-07-10 PROCEDURE — G8417 CALC BMI ABV UP PARAM F/U: HCPCS | Performed by: NURSE PRACTITIONER

## 2023-07-10 PROCEDURE — 1036F TOBACCO NON-USER: CPT | Performed by: NURSE PRACTITIONER

## 2023-07-10 PROCEDURE — G8427 DOCREV CUR MEDS BY ELIG CLIN: HCPCS | Performed by: NURSE PRACTITIONER

## 2023-07-10 NOTE — PROGRESS NOTES
[address] [facility] [address] Baljit Boss M.D., F.A.C.P. Keke Arana, DESHAUN, APRN, CNS  Julian Whitmore, MSN, APRN-FNP-C  Vipul Petit MSN, APRN, FNP-C  Michael RUEDA, PA-C  [facility] MSN, APRN, FNP-C  [facility], [address] [place] [address]  Phone: [phone]  Fax: [phone]       Nhi Murry is a 34 y.o. right handed female     Presents to neurology clinic today for evaluation and management of [facility] [address]    Patient is a decent historian and provides her history. During her initial visit here in June, patient reported she had gone to a routine eye exam in a local [facility] optometry center in February or March. 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 Patient began to notice that she had difficulty focusing even with new prescription glasses prior to that exam in March. Patient's peripheral vision to the left is severely impaired. Patient also has blurred vision to the left eye. Close up patient is able to see words however further back words on the page or posterior look like foreign language and that letters are combined. Patient's right eye continues to have perfect vision. Patient follow back up with her [facility] in April and had more testing completed about 1 to 2 weeks after that. Patient states her vision has not changed at all over this time period. Patient says nothing makes this better or worse. Patient had MRI of the orbits completed in April which suggest possible idiopathic intracranial hypertension. Patient was sent for MRI of the brain few days later on April 9 which was consistent with idiopathic intracranial hypertension. Patient does not have a primary care and it is taken this long to get into see neurology. Patient denies headache, speech or swallowing difficulty, focal weakness, numbness or tingling of extremities.     In July, patient reports that since her

## 2023-07-18 ENCOUNTER — OFFICE VISIT (OUTPATIENT)
Dept: PODIATRY | Age: 33
End: 2023-07-18
Payer: COMMERCIAL

## 2023-07-18 DIAGNOSIS — E11.9 TYPE 2 DIABETES MELLITUS WITHOUT COMPLICATION, UNSPECIFIED WHETHER LONG TERM INSULIN USE (HCC): Primary | ICD-10-CM

## 2023-07-18 DIAGNOSIS — M79.672 LEFT FOOT PAIN: ICD-10-CM

## 2023-07-18 DIAGNOSIS — M72.2 PLANTAR FASCIAL FIBROMATOSIS: ICD-10-CM

## 2023-07-18 PROCEDURE — 3046F HEMOGLOBIN A1C LEVEL >9.0%: CPT | Performed by: PODIATRIST

## 2023-07-18 PROCEDURE — 1036F TOBACCO NON-USER: CPT | Performed by: PODIATRIST

## 2023-07-18 PROCEDURE — G8417 CALC BMI ABV UP PARAM F/U: HCPCS | Performed by: PODIATRIST

## 2023-07-18 PROCEDURE — 2022F DILAT RTA XM EVC RTNOPTHY: CPT | Performed by: PODIATRIST

## 2023-07-18 PROCEDURE — G8427 DOCREV CUR MEDS BY ELIG CLIN: HCPCS | Performed by: PODIATRIST

## 2023-07-18 PROCEDURE — 99203 OFFICE O/P NEW LOW 30 MIN: CPT | Performed by: PODIATRIST

## 2023-07-18 NOTE — PROGRESS NOTES
New patient in office with c/o left foot pain. States she feels a popping in her arch when she walks. Pain is worse when she stands on her feet at work. Hx of connective tissue disease.
strength  Wiggling toes  Negative Homans  Tenderness palpation medial band left plantar fascia. Tenderness with windlass mechanism medial band left plantar fascia. Negative calcaneal squeeze test.  No pain insertion Achilles tendon. Dermatology examination:    No open skin lesions or abrasions bilateral lower extremity. Assessment and Plan:  Lindsey Gonzalez was seen today for foot pain. Diagnoses and all orders for this visit:    Type 2 diabetes mellitus without complication, unspecified whether long term insulin use (HCC)    Plantar fascial fibromatosis    Left foot pain      New referral left heel pain  History of Planter fasciitis  Collected: 12/05/22 1249   Result status: Final   [facility]   Reference range: 4.0 - 5.6 %   Value: 5.8 High     We did discuss radiographs left and right foot from December 2022. Plantar fasciitis is a condition involving inflammation and pain to the heel. I did recommend passive and active range of motion stretches and strengthening of both the Achilles tendon and plantar fascia  Ice 10 minutes each night on the bottom of the plantar fascia  Avoid barefoot  Continue supportive walking shoe with well supported insert  We did discuss over-the-counter power step vision orthotics. Did also write an order today for full-length custom orthotics at [facility] in [place] time. We did discuss cortisone injection medial band plantar fascia left if continued symptoms. She like to hold off on cortisone injection today. I will follow-up in office 1 month. Return in about 1 month (around 8/18/2023). Seen By:  Lee Ann Cleveland DPM      Document was created using voice recognition software. Note was reviewed, however may contain grammatical errors.

## 2023-07-24 ENCOUNTER — OFFICE VISIT (OUTPATIENT)
Dept: ENT CLINIC | Age: 33
End: 2023-07-24
Payer: COMMERCIAL

## 2023-07-24 VITALS
WEIGHT: 250.5 LBS | HEART RATE: 85 BPM | SYSTOLIC BLOOD PRESSURE: 115 MMHG | HEIGHT: 63 IN | DIASTOLIC BLOOD PRESSURE: 85 MMHG | BODY MASS INDEX: 44.38 KG/M2

## 2023-07-24 DIAGNOSIS — R59.0 SUBMENTAL ADENOPATHY: Primary | ICD-10-CM

## 2023-07-24 DIAGNOSIS — R09.81 NASAL CONGESTION: ICD-10-CM

## 2023-07-24 DIAGNOSIS — R09.82 POST-NASAL DRAINAGE: ICD-10-CM

## 2023-07-24 PROCEDURE — 1036F TOBACCO NON-USER: CPT | Performed by: NURSE PRACTITIONER

## 2023-07-24 PROCEDURE — G8417 CALC BMI ABV UP PARAM F/U: HCPCS | Performed by: NURSE PRACTITIONER

## 2023-07-24 PROCEDURE — 99203 OFFICE O/P NEW LOW 30 MIN: CPT | Performed by: NURSE PRACTITIONER

## 2023-07-24 PROCEDURE — G8427 DOCREV CUR MEDS BY ELIG CLIN: HCPCS | Performed by: NURSE PRACTITIONER

## 2023-07-24 RX ORDER — FLUTICASONE PROPIONATE 50 MCG
2 SPRAY, SUSPENSION (ML) NASAL DAILY
Qty: 16 G | Refills: 1 | Status: SHIPPED | OUTPATIENT
Start: 2023-07-24

## 2023-07-24 ASSESSMENT — ENCOUNTER SYMPTOMS
RESPIRATORY NEGATIVE: 1
SHORTNESS OF BREATH: 0
STRIDOR: 0
SINUS PRESSURE: 0
FACIAL SWELLING: 1
SORE THROAT: 0
SINUS PAIN: 0
RHINORRHEA: 0
EYES NEGATIVE: 1

## 2023-07-24 NOTE — PROGRESS NOTES
[facility]  Dr. Krishna Mejia D.O. Ms.Ed. New Consult       Patient Name:  Taryn Zapata  :  1990     CHIEF C/O:    Chief Complaint   Patient presents with    New Patient     Pt states she has a mass in her throat that swells and goes down off and on for a little over a year. She coughs a lot, feels like her mouth is dry and sometimes has trouble swallowing. She states she has trouble losing weight       HISTORY OBTAINED FROM:  patient    HISTORY OF PRESENT ILLNESS:       Ni Ellis is a 34y.o. year old female, here today for:       Swollen gland in neck, dry mouth, scratchy throat. States first noticed lump in neck 1-2 years ago  Has been seen with imaging performed, CT in December showed enlarged submental lymph node, reactive inflammation. States since CT scan no change in size of characteristics  Non tender, no pain in area  Does notice frequent dry mouth  No difficulty eating, drinking or swallowing  States area does swell at times but has not for many months  No thyroid issues  No family hx of head or neck cancers  Does have a smoking hx, currently vapes daily, also smokes marijuana daily  Does complain of persistent congestion with postnasal drainage that causes irritation to her throat. She denies any sinus pain or pressure. She denies any significant rhinorrhea. She denies recurrent history of sinus infections or previous sinus imaging or surgery.          Past Medical History:   Diagnosis Date    ADHD     Anxiety     Benign intracranial hypertension     Bipolar 1 disorder (HCC)     Depression     Diabetes mellitus ([address])     pre diabetes    Hidradenitis     Hyperlipidemia     Hypermobility syndrome     Intracranial hypertension     Plantar fasciitis     PTSD (post-traumatic stress disorder)     Sciatica of left side      Past Surgical History:   Procedure Laterality Date    OTHER SURGICAL HISTORY      EXCISION OF LEFT AXILLARY HIDRADENITIS    TONSILLECTOMY  2008    WISDOM TOOTH

## 2023-08-21 ENCOUNTER — OFFICE VISIT (OUTPATIENT)
Dept: PODIATRY | Age: 33
End: 2023-08-21
Payer: COMMERCIAL

## 2023-08-21 DIAGNOSIS — M72.2 PLANTAR FASCIAL FIBROMATOSIS: ICD-10-CM

## 2023-08-21 DIAGNOSIS — E11.9 TYPE 2 DIABETES MELLITUS WITHOUT COMPLICATION, UNSPECIFIED WHETHER LONG TERM INSULIN USE (HCC): Primary | ICD-10-CM

## 2023-08-21 DIAGNOSIS — M79.672 LEFT FOOT PAIN: ICD-10-CM

## 2023-08-21 PROCEDURE — G8427 DOCREV CUR MEDS BY ELIG CLIN: HCPCS | Performed by: PODIATRIST

## 2023-08-21 PROCEDURE — 2022F DILAT RTA XM EVC RTNOPTHY: CPT | Performed by: PODIATRIST

## 2023-08-21 PROCEDURE — 1036F TOBACCO NON-USER: CPT | Performed by: PODIATRIST

## 2023-08-21 PROCEDURE — G8417 CALC BMI ABV UP PARAM F/U: HCPCS | Performed by: PODIATRIST

## 2023-08-21 PROCEDURE — 99213 OFFICE O/P EST LOW 20 MIN: CPT | Performed by: PODIATRIST

## 2023-08-21 PROCEDURE — 3046F HEMOGLOBIN A1C LEVEL >9.0%: CPT | Performed by: PODIATRIST

## 2023-08-21 NOTE — PROGRESS NOTES
5 muscle strength  Wiggling toes  Negative Homans  No pain plantar fascia right. Decreased tenderness medial band left plantar fascia. Negative calcaneal squeeze test.  Negative Gardner bilateral      Dermatology examination:    No open skin lesions or abrasions bilateral lower extremity. Assessment and Plan:  Jasmin Sesay was seen today for follow-up and foot pain. Diagnoses and all orders for this visit:    Type 2 diabetes mellitus without complication, unspecified whether long term insulin use (HCC)    Plantar fascial fibromatosis    Left foot pain      Collected: 12/05/22 1249   Result status: Final   Resulting lab: [facility]   Reference range: 4.0 - 5.6 %   Value: 5.8 High         Follow-up Planter fasciitis. Follow-up diabetic exam.  Gradual break-in period with her new diabetic custom inserts this Friday when she does get them fit. Avoid barefoot. Progressing well from plantar fascia standpoint. Still did discuss using a frozen water bottle on the bottom of her heel at home 10 minutes at night. Avoid barefoot even at home. Follow-up 2 months. Return in about 2 months (around 10/21/2023). Seen By:  Stiven Rivas DPM      Document was created using voice recognition software. Note was reviewed, however may contain grammatical errors.

## 2023-08-30 ENCOUNTER — HOSPITAL ENCOUNTER (OUTPATIENT)
Dept: INFUSION THERAPY | Age: 33
Discharge: HOME | End: 2023-08-30
Payer: COMMERCIAL

## 2023-08-30 ENCOUNTER — OFFICE VISIT (OUTPATIENT)
Dept: ONCOLOGY | Age: 33
End: 2023-08-30
Payer: COMMERCIAL

## 2023-08-30 VITALS
DIASTOLIC BLOOD PRESSURE: 90 MMHG | SYSTOLIC BLOOD PRESSURE: 127 MMHG | WEIGHT: 251.3 LBS | TEMPERATURE: 97 F | BODY MASS INDEX: 44.52 KG/M2 | HEART RATE: 66 BPM | OXYGEN SATURATION: 99 %

## 2023-08-30 DIAGNOSIS — R59.0 LEFT CERVICAL LYMPHADENOPATHY: ICD-10-CM

## 2023-08-30 DIAGNOSIS — D72.10 EOSINOPHILIA, UNSPECIFIED TYPE: Primary | ICD-10-CM

## 2023-08-30 DIAGNOSIS — D72.10 EOSINOPHILIA, UNSPECIFIED TYPE: ICD-10-CM

## 2023-08-30 LAB
BASOPHILS # BLD: 0.04 K/UL (ref 0–0.2)
BASOPHILS NFR BLD: 0 % (ref 0–2)
EOSINOPHIL # BLD: 1.67 K/UL (ref 0.05–0.5)
EOSINOPHILS RELATIVE PERCENT: 13 % (ref 0–6)
ERYTHROCYTE [DISTWIDTH] IN BLOOD BY AUTOMATED COUNT: 14.4 % (ref 11.5–15)
HCT VFR BLD AUTO: 39.6 % (ref 34–48)
HGB BLD-MCNC: 12.8 G/DL (ref 11.5–15.5)
IMM GRANULOCYTES # BLD AUTO: 0.03 K/UL (ref 0–0.58)
IMM GRANULOCYTES NFR BLD: 0 % (ref 0–5)
LYMPHOCYTES NFR BLD: 3.15 K/UL (ref 1.5–4)
LYMPHOCYTES RELATIVE PERCENT: 24 % (ref 20–42)
MCH RBC QN AUTO: 30 PG (ref 26–35)
MCHC RBC AUTO-ENTMCNC: 32.3 G/DL (ref 32–34.5)
MCV RBC AUTO: 93 FL (ref 80–99.9)
MONOCYTES NFR BLD: 0.71 K/UL (ref 0.1–0.95)
MONOCYTES NFR BLD: 6 % (ref 2–12)
NEUTROPHILS NFR BLD: 57 % (ref 43–80)
NEUTS SEG NFR BLD: 7.33 K/UL (ref 1.8–7.3)
PLATELET # BLD AUTO: 438 K/UL (ref 130–450)
PMV BLD AUTO: 9.3 FL (ref 7–12)
RBC # BLD AUTO: 4.26 M/UL (ref 3.5–5.5)
WBC OTHER # BLD: 12.9 K/UL (ref 4.5–11.5)

## 2023-08-30 PROCEDURE — 99212 OFFICE O/P EST SF 10 MIN: CPT

## 2023-08-30 PROCEDURE — 36415 COLL VENOUS BLD VENIPUNCTURE: CPT

## 2023-08-30 PROCEDURE — G8427 DOCREV CUR MEDS BY ELIG CLIN: HCPCS | Performed by: STUDENT IN AN ORGANIZED HEALTH CARE EDUCATION/TRAINING PROGRAM

## 2023-08-30 PROCEDURE — 85025 COMPLETE CBC W/AUTO DIFF WBC: CPT

## 2023-08-30 PROCEDURE — 99213 OFFICE O/P EST LOW 20 MIN: CPT | Performed by: STUDENT IN AN ORGANIZED HEALTH CARE EDUCATION/TRAINING PROGRAM

## 2023-08-30 PROCEDURE — G8417 CALC BMI ABV UP PARAM F/U: HCPCS | Performed by: STUDENT IN AN ORGANIZED HEALTH CARE EDUCATION/TRAINING PROGRAM

## 2023-08-30 PROCEDURE — 1036F TOBACCO NON-USER: CPT | Performed by: STUDENT IN AN ORGANIZED HEALTH CARE EDUCATION/TRAINING PROGRAM

## 2023-08-30 ASSESSMENT — ENCOUNTER SYMPTOMS
SINUS PAIN: 0
COUGH: 0
SHORTNESS OF BREATH: 0
GASTROINTESTINAL NEGATIVE: 1
SINUS PRESSURE: 0

## 2023-08-30 NOTE — PROGRESS NOTES
[facility]  [address] [place] [id number]  Dept: [phone]  Loc: [phone]  Clinic Progress Note    Referring Provider:  Merle Guerrero MD    Reason for Visit:   Enlarged lymph node in neck    PCP:  Merle Guerrero MD    Demographics: 28 y.o. female    Chief Complaint:   Chief Complaint   Patient presents with    Follow-up     Eosinophilia       Subjective:  No major events. She denies of substantial night sweats. But states that she may have intermittent night sweats, which she attributes possibly due to poor ventilation/malfunctioning air conditioning. She has quit vaping few weeks ago. And cut down marijuana use to twice daily (previously 5-6 times daily. ). Otherwise patient states she is doing well. HPI from Initial Outpatient Consultation (1/6/2023): The patient is a 34 y.o. female comes in after referral from PCP regarding enlarged cervical lymph node. Patient states that she has had symptoms at least for a year, in which this left submental lymph node would fluctuate in size on 3 different occasions. She denies of fevers, chills, night sweats, but states sensation of feeling warm/hot at night, requiring using fans. Per chart review she has had fluctuations in her weight, but largely stable, without a gradual decline. She did have a recent CT soft tissue neck done, suggesting benign appearance of this lymph node. She denies of any other lymph nodes elsewhere. I have also reviewed that she has had some eosinophilia since 2018. She denies of distant/international travel, exposure to recent infections, significant allergies apart from grass pollen (hives). She does have a dog at home, and states it is up-to-date with vaccinations. She denies being on herbal supplementation. She does use medical marijuana since 2017. She denies of significant headaches.     She denies of any bleeding, heavy menstrual

## 2023-10-30 ENCOUNTER — OFFICE VISIT (OUTPATIENT)
Dept: PODIATRY | Age: 33
End: 2023-10-30
Payer: COMMERCIAL

## 2023-10-30 DIAGNOSIS — M72.2 PLANTAR FASCIAL FIBROMATOSIS: ICD-10-CM

## 2023-10-30 DIAGNOSIS — M79.672 LEFT FOOT PAIN: ICD-10-CM

## 2023-10-30 DIAGNOSIS — E11.9 TYPE 2 DIABETES MELLITUS WITHOUT COMPLICATION, UNSPECIFIED WHETHER LONG TERM INSULIN USE (HCC): Primary | ICD-10-CM

## 2023-10-30 PROCEDURE — G8427 DOCREV CUR MEDS BY ELIG CLIN: HCPCS | Performed by: PODIATRIST

## 2023-10-30 PROCEDURE — 99213 OFFICE O/P EST LOW 20 MIN: CPT | Performed by: PODIATRIST

## 2023-10-30 PROCEDURE — G8417 CALC BMI ABV UP PARAM F/U: HCPCS | Performed by: PODIATRIST

## 2023-10-30 PROCEDURE — 3046F HEMOGLOBIN A1C LEVEL >9.0%: CPT | Performed by: PODIATRIST

## 2023-10-30 PROCEDURE — 2022F DILAT RTA XM EVC RTNOPTHY: CPT | Performed by: PODIATRIST

## 2023-10-30 PROCEDURE — 1036F TOBACCO NON-USER: CPT | Performed by: PODIATRIST

## 2023-10-30 PROCEDURE — G8484 FLU IMMUNIZE NO ADMIN: HCPCS | Performed by: PODIATRIST

## 2023-10-30 NOTE — PROGRESS NOTES
Patient in office  to follow up with left foot pain. Did get custom inserts which she states she does not wear always. Pain has improved since last apt. CC:    Follow-up left heel pain      HPI:   Follow-up left heel pain  Continues custom inserts. Has noticed improvement. Denies any heel pain today. Does work on her feet and does stand majority of the day. Pleased with progression no additional pedal complaints today. ROS:  Const: Denies constitutional symptoms  Musculo: Denies symptoms other than stated above  Skin: Denies symptoms other than stated above       Current Outpatient Medications:     fluticasone (FLONASE) 50 MCG/ACT nasal spray, 2 sprays by Each Nostril route daily, Disp: 16 g, Rfl: 1    Multiple Vitamin (MULTIVITAMIN ADULT PO), Take by mouth, Disp: , Rfl:     Collagen-Vitamin C-Biotin (COLLAGEN 1500/C) 500-50-0.8 MG CAPS, Take by mouth, Disp: , Rfl:   Allergies   Allergen Reactions    Grass Pollen(K-O-R-T-Swt Sean) Hives       Past Medical History:   Diagnosis Date    ADHD     Anxiety     Benign intracranial hypertension     Bipolar 1 disorder (HCC)     Depression     Diabetes mellitus ([address])     pre diabetes    Hidradenitis     Hyperlipidemia     Hypermobility syndrome     Intracranial hypertension     Plantar fasciitis     PTSD (post-traumatic stress disorder)     Sciatica of left side            There were no vitals filed for this visit. Work History/Social History: Foot and ankle history:     Focused Lower Extremity Physical Exam:    Neurovascular examination:    Dorsalis Pedis palpable bilateral.  Posterior tibialis palpable bilateral.    Capillary Refill Time:  Immediate return  Hair growth:  Symmetrical and bilateral   Skin:  Not atrophic  Edema: No edema bilateral feet or ankles.   Neurologic:  Light touch intact bilateral.      Musculoskeletal/ Orthopedic examination:    Equinis: Absent bilateral  Dorsiflexion, plantarflexion, inversion, eversion bilateral 5 out of 5 muscle

## 2024-03-27 ENCOUNTER — HOSPITAL ENCOUNTER (EMERGENCY)
Age: 34
Discharge: HOME | End: 2024-03-27
Payer: COMMERCIAL

## 2024-03-27 ENCOUNTER — APPOINTMENT (OUTPATIENT)
Dept: GENERAL RADIOLOGY | Age: 34
End: 2024-03-27
Payer: COMMERCIAL

## 2024-03-27 VITALS
RESPIRATION RATE: 18 BRPM | SYSTOLIC BLOOD PRESSURE: 144 MMHG | DIASTOLIC BLOOD PRESSURE: 94 MMHG | HEART RATE: 78 BPM | TEMPERATURE: 98 F | HEIGHT: 63 IN | WEIGHT: 245 LBS | OXYGEN SATURATION: 98 % | BODY MASS INDEX: 43.41 KG/M2

## 2024-03-27 DIAGNOSIS — S86.912A STRAIN OF LEFT KNEE, INITIAL ENCOUNTER: Primary | ICD-10-CM

## 2024-03-27 PROCEDURE — 6370000000 HC RX 637 (ALT 250 FOR IP): Performed by: NURSE PRACTITIONER

## 2024-03-27 PROCEDURE — 73562 X-RAY EXAM OF KNEE 3: CPT

## 2024-03-27 PROCEDURE — 99283 EMERGENCY DEPT VISIT LOW MDM: CPT

## 2024-03-27 RX ORDER — METHYLPREDNISOLONE 4 MG/1
TABLET ORAL
Qty: 1 KIT | Refills: 0 | Status: SHIPPED | OUTPATIENT
Start: 2024-03-27 | End: 2024-04-02

## 2024-03-27 RX ORDER — ACETAMINOPHEN 500 MG
1000 TABLET ORAL ONCE
Status: COMPLETED | OUTPATIENT
Start: 2024-03-27 | End: 2024-03-27

## 2024-03-27 RX ADMIN — ACETAMINOPHEN 1000 MG: 500 TABLET ORAL at 19:35

## 2024-03-27 RX ADMIN — PREDNISONE 50 MG: 20 TABLET ORAL at 19:35

## 2024-03-27 ASSESSMENT — PAIN DESCRIPTION - LOCATION: LOCATION: KNEE

## 2024-03-27 ASSESSMENT — PAIN DESCRIPTION - ORIENTATION: ORIENTATION: LEFT

## 2024-03-27 ASSESSMENT — PAIN SCALES - GENERAL: PAINLEVEL_OUTOF10: 8

## 2024-03-27 NOTE — ED NOTES
Department of Emergency Medicine    FIRST PROVIDER TRIAGE NOTE             Independent MLP           3/27/24  5:41 PM EDT    Date of Encounter: 3/27/24   MRN: [id number]    Vitals:    03/27/24 1657 03/27/24 1715 03/27/24 1730   BP:  (!) 144/94    Pulse: 90 78    Resp:  18    Temp: 98 °F (36.7 °C)     SpO2: 99% 98%    Weight:   111.1 kg (245 lb)   Height:   1.6 m (5' 3\")        HPI: Leanne Grewal is a 33 y.o. female who presents to the ED for Knee Pain (For 2 days)         ROS: Negative for leg swelling.    Physical Exam:   Gen Appearance/Constitutional: alert  Pulm: Respirations easy and unlabored      Initial Plan of Care: All treatment areas with department are currently occupied.     Plan to order/Initiate the following while awaiting opening in ED: Triage evaluation      Initial Plan of Care: Initiate Treatment-Testing, Proceed toTreatment Area When Bed Available for ED Attending/MLP to Continue Care    Electronically signed by AMERICO Ureña CNP   DD: 3/27/24       Caryl Tinsley APRN - CNP  03/27/24 7184

## 2024-03-28 NOTE — ED PROVIDER NOTES
Independent LETITIA Visit.  HPI:  3/27/24,   Time: 8:06 PM EDT         Leanne Grewal is a 33 y.o. female presenting to the ED for evaluation of left knee pain.  She reports that she has a hypermobility syndrome and has episodes somewhat like this pretty frequently.  She states yesterday she went to get up and felt a pop behind her left knee.  She states that she did have some discomfort at the time but was able to ambulate and bear weight.  She states today while she was bending down into a cooler to pull out some beverages she felt like she really could not put weight on that left leg at the area of the knee.  She does not have any swelling or deformity does have full range of motion but complains of pain with extension of the knee.  Has not taken anything for pain at home currently rating her pain is 8 out of 10.  She is ambulatory into the emergency department.      ROS:   Pertinent positives and negatives are stated within HPI, all other systems reviewed and are negative.  --------------------------------------------- PAST HISTORY ---------------------------------------------  Past Medical History:  has a past medical history of ADHD, Anxiety, Benign intracranial hypertension, Bipolar 1 disorder (HCC), Depression, Diabetes mellitus (HCC), Hidradenitis, Hyperlipidemia, Hypermobility syndrome, Intracranial hypertension, Plantar fasciitis, PTSD (post-traumatic stress disorder), and Sciatica of left side.    Past Surgical History:  has a past surgical history that includes Tonsillectomy (2008); other surgical history; and [place] tooth extraction.    Social History:  reports that she quit smoking about 8 years ago. Her smoking use included cigarettes. She has never used smokeless tobacco. She reports current alcohol use. She reports current drug use. Drug: Marijuana (Weed).    Family History: family history includes Brain Cancer in her paternal grandfather; Cancer in her maternal aunt, maternal uncle, paternal aunt,

## 2024-04-08 ENCOUNTER — HOSPITAL ENCOUNTER (OUTPATIENT)
Age: 34
Discharge: HOME | End: 2024-04-08
Payer: COMMERCIAL

## 2024-04-08 ENCOUNTER — OFFICE VISIT (OUTPATIENT)
Dept: FAMILY MEDICINE CLINIC | Age: 34
End: 2024-04-08
Payer: COMMERCIAL

## 2024-04-08 VITALS
DIASTOLIC BLOOD PRESSURE: 80 MMHG | RESPIRATION RATE: 18 BRPM | OXYGEN SATURATION: 98 % | WEIGHT: 244.6 LBS | HEIGHT: 63 IN | TEMPERATURE: 97.8 F | SYSTOLIC BLOOD PRESSURE: 136 MMHG | BODY MASS INDEX: 43.34 KG/M2 | HEART RATE: 79 BPM

## 2024-04-08 DIAGNOSIS — R73.03 PREDIABETES: ICD-10-CM

## 2024-04-08 DIAGNOSIS — M35.7 BENIGN HYPERMOBILITY SYNDROME: ICD-10-CM

## 2024-04-08 DIAGNOSIS — E78.5 DYSLIPIDEMIA: ICD-10-CM

## 2024-04-08 DIAGNOSIS — E66.01 MORBID OBESITY (HCC): ICD-10-CM

## 2024-04-08 DIAGNOSIS — J01.90 ACUTE SINUSITIS, RECURRENCE NOT SPECIFIED, UNSPECIFIED LOCATION: Primary | ICD-10-CM

## 2024-04-08 DIAGNOSIS — R53.83 OTHER FATIGUE: ICD-10-CM

## 2024-04-08 PROBLEM — E11.9 DIABETES MELLITUS (HCC): Status: RESOLVED | Noted: 2023-01-06 | Resolved: 2024-04-08

## 2024-04-08 LAB
ALBUMIN SERPL-MCNC: 3.8 G/DL (ref 3.5–5.2)
ALP SERPL-CCNC: 77 U/L (ref 35–104)
ALT SERPL-CCNC: 24 U/L (ref 0–32)
ANION GAP SERPL CALCULATED.3IONS-SCNC: 12 MMOL/L (ref 7–16)
AST SERPL-CCNC: 15 U/L (ref 0–31)
BASOPHILS # BLD: 0.03 K/UL (ref 0–0.2)
BASOPHILS NFR BLD: 0 % (ref 0–2)
BILIRUB SERPL-MCNC: 0.2 MG/DL (ref 0–1.2)
BUN SERPL-MCNC: 11 MG/DL (ref 6–20)
CALCIUM SERPL-MCNC: 8.5 MG/DL (ref 8.6–10.2)
CHLORIDE SERPL-SCNC: 103 MMOL/L (ref 98–107)
CHOLEST SERPL-MCNC: 166 MG/DL
CO2 SERPL-SCNC: 23 MMOL/L (ref 22–29)
CREAT SERPL-MCNC: 0.6 MG/DL (ref 0.5–1)
EOSINOPHIL # BLD: 0.63 K/UL (ref 0.05–0.5)
EOSINOPHILS RELATIVE PERCENT: 7 % (ref 0–6)
ERYTHROCYTE [DISTWIDTH] IN BLOOD BY AUTOMATED COUNT: 14.5 % (ref 11.5–15)
GFR SERPL CREATININE-BSD FRML MDRD: >90 ML/MIN/1.73M2
GLUCOSE SERPL-MCNC: 91 MG/DL (ref 74–99)
HBA1C MFR BLD: 5.9 %
HCT VFR BLD AUTO: 39 % (ref 34–48)
HDLC SERPL-MCNC: 37 MG/DL
HGB BLD-MCNC: 13.2 G/DL (ref 11.5–15.5)
IMM GRANULOCYTES # BLD AUTO: 0.03 K/UL (ref 0–0.58)
IMM GRANULOCYTES NFR BLD: 0 % (ref 0–5)
LDH SERPL-CCNC: 156 U/L (ref 135–214)
LDLC SERPL CALC-MCNC: 116 MG/DL
LYMPHOCYTES NFR BLD: 3.04 K/UL (ref 1.5–4)
LYMPHOCYTES RELATIVE PERCENT: 34 % (ref 20–42)
MCH RBC QN AUTO: 30.7 PG (ref 26–35)
MCHC RBC AUTO-ENTMCNC: 33.8 G/DL (ref 32–34.5)
MCV RBC AUTO: 90.7 FL (ref 80–99.9)
MONOCYTES NFR BLD: 0.35 K/UL (ref 0.1–0.95)
MONOCYTES NFR BLD: 4 % (ref 2–12)
NEUTROPHILS NFR BLD: 55 % (ref 43–80)
NEUTS SEG NFR BLD: 4.89 K/UL (ref 1.8–7.3)
PLATELET # BLD AUTO: 380 K/UL (ref 130–450)
PMV BLD AUTO: 9.4 FL (ref 7–12)
POTASSIUM SERPL-SCNC: 3.6 MMOL/L (ref 3.5–5)
PROT SERPL-MCNC: 7 G/DL (ref 6.4–8.3)
RBC # BLD AUTO: 4.3 M/UL (ref 3.5–5.5)
SODIUM SERPL-SCNC: 138 MMOL/L (ref 132–146)
TRIGL SERPL-MCNC: 66 MG/DL
TSH SERPL DL<=0.05 MIU/L-ACNC: 0.98 UIU/ML (ref 0.27–4.2)
URATE SERPL-MCNC: 4.6 MG/DL (ref 2.4–5.7)
VLDLC SERPL CALC-MCNC: 13 MG/DL
WBC OTHER # BLD: 9 K/UL (ref 4.5–11.5)

## 2024-04-08 PROCEDURE — 80053 COMPREHEN METABOLIC PANEL: CPT

## 2024-04-08 PROCEDURE — 83615 LACTATE (LD) (LDH) ENZYME: CPT

## 2024-04-08 PROCEDURE — G8417 CALC BMI ABV UP PARAM F/U: HCPCS | Performed by: FAMILY MEDICINE

## 2024-04-08 PROCEDURE — 84443 ASSAY THYROID STIM HORMONE: CPT

## 2024-04-08 PROCEDURE — 83036 HEMOGLOBIN GLYCOSYLATED A1C: CPT | Performed by: FAMILY MEDICINE

## 2024-04-08 PROCEDURE — 99203 OFFICE O/P NEW LOW 30 MIN: CPT | Performed by: FAMILY MEDICINE

## 2024-04-08 PROCEDURE — 36415 COLL VENOUS BLD VENIPUNCTURE: CPT

## 2024-04-08 PROCEDURE — 80061 LIPID PANEL: CPT

## 2024-04-08 PROCEDURE — 85025 COMPLETE CBC W/AUTO DIFF WBC: CPT

## 2024-04-08 PROCEDURE — G8427 DOCREV CUR MEDS BY ELIG CLIN: HCPCS | Performed by: FAMILY MEDICINE

## 2024-04-08 PROCEDURE — 84550 ASSAY OF BLOOD/URIC ACID: CPT

## 2024-04-08 PROCEDURE — 1036F TOBACCO NON-USER: CPT | Performed by: FAMILY MEDICINE

## 2024-04-08 RX ORDER — FLUTICASONE PROPIONATE 50 MCG
2 SPRAY, SUSPENSION (ML) NASAL DAILY
Qty: 16 G | Refills: 1 | Status: SHIPPED | OUTPATIENT
Start: 2024-04-08

## 2024-04-08 RX ORDER — AZELASTINE 1 MG/ML
1 SPRAY, METERED NASAL 2 TIMES DAILY
Qty: 60 ML | Refills: 1 | Status: SHIPPED | OUTPATIENT
Start: 2024-04-08

## 2024-04-08 RX ORDER — GUAIFENESIN 600 MG/1
600 TABLET, EXTENDED RELEASE ORAL 2 TIMES DAILY
Qty: 30 TABLET | Refills: 0 | Status: SHIPPED | OUTPATIENT
Start: 2024-04-08 | End: 2024-04-23

## 2024-04-08 ASSESSMENT — PATIENT HEALTH QUESTIONNAIRE - PHQ9
4. FEELING TIRED OR HAVING LITTLE ENERGY: SEVERAL DAYS
SUM OF ALL RESPONSES TO PHQ9 QUESTIONS 1 & 2: 1
9. THOUGHTS THAT YOU WOULD BE BETTER OFF DEAD, OR OF HURTING YOURSELF: NOT AT ALL
SUM OF ALL RESPONSES TO PHQ QUESTIONS 1-9: 8
1. LITTLE INTEREST OR PLEASURE IN DOING THINGS: SEVERAL DAYS
SUM OF ALL RESPONSES TO PHQ QUESTIONS 1-9: 8
2. FEELING DOWN, DEPRESSED OR HOPELESS: NOT AT ALL
6. FEELING BAD ABOUT YOURSELF - OR THAT YOU ARE A FAILURE OR HAVE LET YOURSELF OR YOUR FAMILY DOWN: NOT AT ALL
5. POOR APPETITE OR OVEREATING: SEVERAL DAYS
SUM OF ALL RESPONSES TO PHQ QUESTIONS 1-9: 8
7. TROUBLE CONCENTRATING ON THINGS, SUCH AS READING THE NEWSPAPER OR WATCHING TELEVISION: NEARLY EVERY DAY
3. TROUBLE FALLING OR STAYING ASLEEP: SEVERAL DAYS
8. MOVING OR SPEAKING SO SLOWLY THAT OTHER PEOPLE COULD HAVE NOTICED. OR THE OPPOSITE, BEING SO FIGETY OR RESTLESS THAT YOU HAVE BEEN MOVING AROUND A LOT MORE THAN USUAL: SEVERAL DAYS
SUM OF ALL RESPONSES TO PHQ QUESTIONS 1-9: 8
10. IF YOU CHECKED OFF ANY PROBLEMS, HOW DIFFICULT HAVE THESE PROBLEMS MADE IT FOR YOU TO DO YOUR WORK, TAKE CARE OF THINGS AT HOME, OR GET ALONG WITH OTHER PEOPLE: SOMEWHAT DIFFICULT

## 2024-04-08 ASSESSMENT — ENCOUNTER SYMPTOMS
COLOR CHANGE: 0
APNEA: 0
SORE THROAT: 0
SHORTNESS OF BREATH: 0
EYE PAIN: 0
WHEEZING: 0
VOMITING: 0
CHOKING: 0
NAUSEA: 0
COUGH: 0
ORTHOPNEA: 0
VOICE CHANGE: 0
DIARRHEA: 0
TROUBLE SWALLOWING: 0
ALLERGIC/IMMUNOLOGIC NEGATIVE: 1
EYE DISCHARGE: 0
BLOOD IN STOOL: 0
FACIAL SWELLING: 0
EYE ITCHING: 0
ABDOMINAL PAIN: 0
STRIDOR: 0
BACK PAIN: 0
RHINORRHEA: 0
CHEST TIGHTNESS: 0
ANAL BLEEDING: 0
BLURRED VISION: 0
SINUS PAIN: 0
SINUS PRESSURE: 0
RESPIRATORY NEGATIVE: 1
PHOTOPHOBIA: 0
CONSTIPATION: 0
ABDOMINAL DISTENTION: 0
EYE REDNESS: 0
RECTAL PAIN: 0
GASTROINTESTINAL NEGATIVE: 1

## 2024-04-08 NOTE — PROGRESS NOTES
SUBJECTIVE  Leanne Grewal is a 33 y.o. female.    HPI/Chief C/O:  Chief Complaint   Patient presents with    New Patient     Pt here to establish with Dr. Yordan Salazar    Diabetes     A1c pended    Back Pain     Pt has a history of sciatica. Has been flared up since February 2024. Was seeing pain management but the location closed    URI     Pt gets sick every other month since November. Pt has nasal congestion, lots of mucous, etc      Weight Management     Pt trying to lose weight      Allergies   Allergen Reactions    Grass Pollen(K-O-R-T-Swt Sean) Hives   This patient is here to establish care as a new patient in our office  We will review all past medical history and go over past and current medications   We will review all medical records that are available to us  We will reconcile our care planning and treatment goals to past and present for this patient         Hypertension  Associated symptoms include anxiety and malaise/fatigue. Pertinent negatives include no blurred vision, chest pain, headaches, neck pain, orthopnea, palpitations, peripheral edema, PND, shortness of breath or sweats. Risk factors for coronary artery disease include obesity, smoking/tobacco exposure and stress. Past treatments include lifestyle changes. The current treatment provides significant improvement. Compliance problems include diet, exercise and psychosocial issues.  There is no history of angina, kidney disease, CAD/MI, CVA, heart failure, left ventricular hypertrophy, PVD or retinopathy. There is no history of chronic renal disease, coarctation of the aorta, hyperaldosteronism, hypercortisolism, hyperparathyroidism, a hypertension causing med, pheochromocytoma, renovascular disease, sleep apnea or a thyroid problem.         ROS:  Review of Systems   Constitutional:  Positive for fatigue and malaise/fatigue. Negative for activity change, appetite change, chills, diaphoresis, fever and unexpected weight change.   HENT:

## 2024-04-15 ENCOUNTER — TELEPHONE (OUTPATIENT)
Dept: FAMILY MEDICINE CLINIC | Age: 34
End: 2024-04-15

## 2024-04-15 DIAGNOSIS — M54.32 LEFT SIDED SCIATICA: Primary | ICD-10-CM

## 2024-04-15 NOTE — TELEPHONE ENCOUNTER
Called states she's been having sciatic pain and would like set up with pain management for this.

## 2024-04-25 ENCOUNTER — TELEPHONE (OUTPATIENT)
Dept: FAMILY MEDICINE CLINIC | Age: 34
End: 2024-04-25

## 2024-04-25 NOTE — TELEPHONE ENCOUNTER
Patient said if something comes for fmla  wants it to be more specific on the type of pant, she said business cheri, non polyester , must wear cotton or cotton blend pant can be purchased by patient out of pocket.

## 2024-04-26 ENCOUNTER — OFFICE VISIT (OUTPATIENT)
Dept: PAIN MANAGEMENT | Age: 34
End: 2024-04-26
Payer: COMMERCIAL

## 2024-04-26 VITALS
SYSTOLIC BLOOD PRESSURE: 124 MMHG | TEMPERATURE: 97.1 F | HEIGHT: 63 IN | RESPIRATION RATE: 18 BRPM | HEART RATE: 78 BPM | WEIGHT: 244 LBS | BODY MASS INDEX: 43.23 KG/M2 | OXYGEN SATURATION: 98 % | DIASTOLIC BLOOD PRESSURE: 84 MMHG

## 2024-04-26 DIAGNOSIS — Z79.899 MEDICAL MARIJUANA USE: ICD-10-CM

## 2024-04-26 DIAGNOSIS — M47.817 LUMBOSACRAL SPONDYLOSIS WITHOUT MYELOPATHY: Primary | ICD-10-CM

## 2024-04-26 DIAGNOSIS — M51.36 DDD (DEGENERATIVE DISC DISEASE), LUMBAR: ICD-10-CM

## 2024-04-26 DIAGNOSIS — E66.9 OBESITY, UNSPECIFIED CLASSIFICATION, UNSPECIFIED OBESITY TYPE, UNSPECIFIED WHETHER SERIOUS COMORBIDITY PRESENT: ICD-10-CM

## 2024-04-26 DIAGNOSIS — M79.2 NEURALGIA AND NEURITIS: ICD-10-CM

## 2024-04-26 PROCEDURE — 1036F TOBACCO NON-USER: CPT | Performed by: ANESTHESIOLOGY

## 2024-04-26 PROCEDURE — G8427 DOCREV CUR MEDS BY ELIG CLIN: HCPCS | Performed by: ANESTHESIOLOGY

## 2024-04-26 PROCEDURE — 99204 OFFICE O/P NEW MOD 45 MIN: CPT | Performed by: ANESTHESIOLOGY

## 2024-04-26 PROCEDURE — G8417 CALC BMI ABV UP PARAM F/U: HCPCS | Performed by: ANESTHESIOLOGY

## 2024-04-26 NOTE — PROGRESS NOTES
Patient:  Leanne Grewal,  1990  Date of Service:  24      Patient presents to [facility] with complaints of lower back pain that started 7 years ago and has been getting worse.     She states the pain began following No specific cause    Pain is intermittent and is described as shooting, sharp, burning, and numb with spasms. She rates the pain as a 10/10 on her worst day , 1/10 on her best day, and a 5/10 on average on the VAS scale.     Pain does radiate to both lower extremities. She  has numbness, tingling, weakness of the both lower extremities.    Alleviating factors include: medical marijuana.  Aggravating factors include:  movement, standing. She states that the pain does keep her from sleeping at night. She took her last dose of Tylenol and Aleve  yesterday.     She is  on NSAIDS and  is not on anticoagulation medications to include none and is managed by NA.     Previous treatments: Physical Therapy, Epidural Steroid Injection, and medications..      Personal Expectations from this treatment: increase activity and decrease pain    Resp 18   Ht 1.6 m (5' 2.99\")   Wt 110.7 kg (244 lb)   LMP 2024   BMI 43.23 kg/m²     Patient's last menstrual period was 2024.  
4/26/2024    I was present for the physical exam and/or procedure of Leanne Grewal by Thmo Weiner M.D.  
marijuana use          33 y.o. female with h/o chronic low back pain.    Pain predominantly axial in nature.    Failed conservative treatment for > 6 weeks recently.    Xray hip reviewed.    H/o medical marijuana use + ( apparently does not have a card).    Obesity +    H/o PTSD/ anxiety- stable.    PLAN:  Xray LS spine    MRI LS spine    TENS unit    NSAID's prn use    Consider lumbar spine interventions after MRI- facet MBNB Vs CHELY.    Bariatric consult for weight loss.    Caution with controlled meds in view of ongoing marijuana use.    Counseling :    Patient encouraged to stay active and to watch/lose weight    Encouraged to continue Regular home exercise program as tolerated - stretching / strengthening.    Treatment plan discussed with the patient including medication and procedure side effects.    Controlled Substances Monitoring: [place]ARRS reviewed    Thom Weiner MD    Dear Dr. Salazar,   Thank you for referring Ms. Leanne Grewal and allowing us to participate in her care. Please do not hesitate to contact me if you have any questions regarding her care.    Regards,  Thom Weiner MD    CC:    Yordan Salazar,   [address] [place] [phone]     NOTE: The above documentation was prepared using voice recognition software.  Every attempt was made to ensure accuracy but there may be spelling, grammatical, and contextual errors.

## 2024-05-20 ENCOUNTER — TELEPHONE (OUTPATIENT)
Dept: BARIATRICS/WEIGHT MGMT | Age: 34
End: 2024-05-20

## 2024-06-03 ENCOUNTER — HOSPITAL ENCOUNTER (OUTPATIENT)
Dept: MRI IMAGING | Age: 34
Discharge: HOME | End: 2024-06-05
Attending: ANESTHESIOLOGY
Payer: COMMERCIAL

## 2024-06-03 DIAGNOSIS — M47.817 LUMBOSACRAL SPONDYLOSIS WITHOUT MYELOPATHY: ICD-10-CM

## 2024-06-03 DIAGNOSIS — M51.36 DDD (DEGENERATIVE DISC DISEASE), LUMBAR: ICD-10-CM

## 2024-06-03 PROCEDURE — 72148 MRI LUMBAR SPINE W/O DYE: CPT

## 2024-06-11 ENCOUNTER — TELEPHONE (OUTPATIENT)
Dept: FAMILY MEDICINE CLINIC | Age: 34
End: 2024-06-11

## 2024-09-03 ENCOUNTER — TELEPHONE (OUTPATIENT)
Dept: FAMILY MEDICINE CLINIC | Age: 34
End: 2024-09-03

## 2024-09-03 NOTE — TELEPHONE ENCOUNTER
----- Message from Ma. R sent at 9/3/2024  3:34 PM EDT -----  Regarding: ECC Escalation To Practice  ECC Escalation To Practice      Type of Escalation: Acute Care Symptom  --------------------------------------------------------------------------------------------------------------------------    Information for Provider: Patient disconnected while trying to transfer directly to practice, please reach out to her immediately   Patient is looking for appointment for: Symptom: experiencing extreme sorenes on feet, weakness on legs and hand  Reasons for Message: Patient disconnected     Additional Information:  Patient experiencing extreme sorenes on feet, weakness on legs and hand, but already hung-up when trying to connect directly to practice  --------------------------------------------------------------------------------------------------------------------------    Relationship to Patient: Self     Call Back Info: OK to leave message on voicemail  Preferred Call Back Number: Phone [phone] (home)

## 2024-11-06 ENCOUNTER — OFFICE VISIT (OUTPATIENT)
Dept: FAMILY MEDICINE CLINIC | Age: 34
End: 2024-11-06

## 2024-11-06 VITALS
OXYGEN SATURATION: 98 % | DIASTOLIC BLOOD PRESSURE: 80 MMHG | SYSTOLIC BLOOD PRESSURE: 126 MMHG | BODY MASS INDEX: 41.71 KG/M2 | HEIGHT: 63 IN | WEIGHT: 235.4 LBS | HEART RATE: 74 BPM | TEMPERATURE: 97.5 F | RESPIRATION RATE: 18 BRPM

## 2024-11-06 DIAGNOSIS — L73.2 HIDRADENITIS SUPPURATIVA: Primary | ICD-10-CM

## 2024-11-06 DIAGNOSIS — R73.03 PREDIABETES: ICD-10-CM

## 2024-11-06 DIAGNOSIS — R53.83 OTHER FATIGUE: ICD-10-CM

## 2024-11-06 DIAGNOSIS — E78.5 DYSLIPIDEMIA: ICD-10-CM

## 2024-11-06 DIAGNOSIS — M15.0 PRIMARY OSTEOARTHRITIS INVOLVING MULTIPLE JOINTS: ICD-10-CM

## 2024-11-06 DIAGNOSIS — E66.01 MORBID OBESITY: ICD-10-CM

## 2024-11-06 PROBLEM — D75.839 THROMBOCYTOSIS: Status: RESOLVED | Noted: 2018-05-07 | Resolved: 2024-11-06

## 2024-11-06 PROCEDURE — 99213 OFFICE O/P EST LOW 20 MIN: CPT | Performed by: FAMILY MEDICINE

## 2024-11-06 SDOH — ECONOMIC STABILITY: INCOME INSECURITY: HOW HARD IS IT FOR YOU TO PAY FOR THE VERY BASICS LIKE FOOD, HOUSING, MEDICAL CARE, AND HEATING?: NOT VERY HARD

## 2024-11-06 SDOH — ECONOMIC STABILITY: FOOD INSECURITY: WITHIN THE PAST 12 MONTHS, THE FOOD YOU BOUGHT JUST DIDN'T LAST AND YOU DIDN'T HAVE MONEY TO GET MORE.: NEVER TRUE

## 2024-11-06 SDOH — ECONOMIC STABILITY: FOOD INSECURITY: WITHIN THE PAST 12 MONTHS, YOU WORRIED THAT YOUR FOOD WOULD RUN OUT BEFORE YOU GOT MONEY TO BUY MORE.: NEVER TRUE

## 2024-11-06 SDOH — ECONOMIC STABILITY: TRANSPORTATION INSECURITY
IN THE PAST 12 MONTHS, HAS LACK OF TRANSPORTATION KEPT YOU FROM MEETINGS, WORK, OR FROM GETTING THINGS NEEDED FOR DAILY LIVING?: NO

## 2024-11-06 ASSESSMENT — ENCOUNTER SYMPTOMS
APNEA: 0
VOICE CHANGE: 0
CHOKING: 0
ORTHOPNEA: 0
SORE THROAT: 0
RECTAL PAIN: 0
EYE ITCHING: 0
NAUSEA: 0
RHINORRHEA: 0
CONSTIPATION: 0
WHEEZING: 0
EYE PAIN: 0
DIARRHEA: 0
ANAL BLEEDING: 0
PHOTOPHOBIA: 0
COLOR CHANGE: 0
SHORTNESS OF BREATH: 0
BLOOD IN STOOL: 0
SINUS PRESSURE: 0
BLURRED VISION: 0
COUGH: 0
ABDOMINAL DISTENTION: 0
SINUS PAIN: 0
FACIAL SWELLING: 0
EYE REDNESS: 0
VOMITING: 0
ABDOMINAL PAIN: 0
TROUBLE SWALLOWING: 0
EYE DISCHARGE: 0
CHEST TIGHTNESS: 0
GASTROINTESTINAL NEGATIVE: 1
RESPIRATORY NEGATIVE: 1
ALLERGIC/IMMUNOLOGIC NEGATIVE: 1
STRIDOR: 0
BACK PAIN: 0

## 2024-11-06 NOTE — PROGRESS NOTES
problems      Discussed importance of regular Health Maintenance follow up  Health Maintenance   Topic    Diabetic foot exam     Varicella vaccine (1 of 2 - 13+ 2-dose series)    Diabetic Alb to Cr ratio (uACR) test     Diabetic retinal exam     Hepatitis B vaccine (1 of 3 - 19+ 3-dose series)    Cervical cancer screen     Flu vaccine (1)    COVID-19 Vaccine (4 - 2023-24 season)    A1C test (Diabetic or Prediabetic)     Lipids     Depression Monitoring     GFR test (Diabetes, CKD 3-4, OR last GFR 15-59)     DTaP/Tdap/Td vaccine (3 - Td or Tdap)    Hepatitis C screen     HIV screen     Hepatitis A vaccine     Hib vaccine     HPV vaccine     Polio vaccine     Meningococcal (ACWY) vaccine     Pneumococcal 0-64 years Vaccine

## 2024-11-07 ENCOUNTER — PATIENT MESSAGE (OUTPATIENT)
Dept: FAMILY MEDICINE CLINIC | Age: 34
End: 2024-11-07

## 2025-02-24 ENCOUNTER — TELEPHONE (OUTPATIENT)
Dept: FAMILY MEDICINE CLINIC | Age: 35
End: 2025-02-24

## 2025-02-24 NOTE — TELEPHONE ENCOUNTER
Patient called asking for letter to rtw from he leave of absence. Does not want to come in for appt, states no insurance at this time.

## 2025-02-24 NOTE — TELEPHONE ENCOUNTER
Letter written Faxed to [phone] per pt.     Electronically signed by YUNI MANCINI MA on 2/24/25 at 3:09 PM EST

## 2025-07-28 NOTE — PROGRESS NOTES
Labs drawn peripherally. Dry dressing applied. Patient tolerated well. Specimen sent to lab. Patient Education        ????????????????: ????????? ???????????  Hypoglycemia: Care Instructions  ??????? ????????? ???????????     ?????????????????? ???? ??????? ???? ???? ????? ? ? ??????? ?????? ???????? ????? ??????? ???????? ??? ????? ?????? ???? ?????????? ???????? ???????? ??????? ????? ???? ???? ?????? ???????? ????? ????? ???? ????????? ????? ???? ???? ????????? ????? ??? ?? ????????? ?????? ????? ???????? ???????????????? ??? ????? ?????????????, ?????? ?? ??????? ???????? ??? ????? ???? ???? ????? ???????  ???? ???? ???? ?? ????? ??????? ???? ???? ????? ?????? ? ???????? ??????? ??????????? ??? ???????????  ??????? ???????? ??????? ????? ???? ???? ???? ??????????? ???????? ????? ??????? ???????? ???? ?? ???? ???? ??????? ???? ??????? ???? ?????, ??????? ????? ????? ?????? ???? ???????? ????? ????????? ?????? ???? ????? ???? ? ???? ?????? ???????? ???? ?????? ??? ?????  ???-?? ?????? ??????? ????? ??? ????????? ????? ??? ??? ??? ????????????? ??? ??? ????? ??????? ????????? ???? ??? ???????? ????????? ??????? ? ??? ????? ????????? ?? ?????????? ????? ?????? ????????? ???? ????? ??? ????? ???? ????????? ???? ?????? ??? ?? ?????? ????? ???  ????? ???? ???? ????? ??????? ???? ???????????  ????? ???? ?????? ?????????? ????????? ???? ?????????? ?????????? ????? ?????? ???????:  ????????  ????  ??????, ??????? ?? ??????? ??????  ????, ???? ?????  ????? (??????? ??????? ?????? ????)?  ?????? ????? ???? ?????  ?????? ?????? ?? ???? ??????? ?? ??????????  ??????? ????? ???? ?????? ????????? ???????? ????, ?????-???? ???? ???? ???????? ?? ?????????? ??????? ?????????, ???? ????, ????? ???????? (????? ???? ??????), ??????? ??? ? ?????? (???? ????) ???? ?????? ?????  ????, ???????? ???? ???????? ???? ????? ???? ?????? ?????? ????? ???? ?????????????  ?? ????? ???????? ??????? ??????? ??????????  ??????? ?????? ??? ?? ? ????? ???????? ????? ????? ????? ???? ?????? ???????????? ????? ?????? ??????????? ???? ????????